# Patient Record
Sex: MALE | Race: WHITE | NOT HISPANIC OR LATINO
[De-identification: names, ages, dates, MRNs, and addresses within clinical notes are randomized per-mention and may not be internally consistent; named-entity substitution may affect disease eponyms.]

---

## 2017-12-29 ENCOUNTER — RESULT REVIEW (OUTPATIENT)
Age: 82
End: 2017-12-29

## 2017-12-29 ENCOUNTER — APPOINTMENT (OUTPATIENT)
Dept: DERMATOLOGY | Facility: CLINIC | Age: 82
End: 2017-12-29
Payer: MEDICARE

## 2017-12-29 PROCEDURE — 17003 DESTRUCT PREMALG LES 2-14: CPT

## 2017-12-29 PROCEDURE — 69100 BIOPSY OF EXTERNAL EAR: CPT | Mod: 59

## 2017-12-29 PROCEDURE — 11100 BX SKIN SUBCUTANEOUS&/MUCOUS MEMBRANE 1 LESION: CPT | Mod: 59

## 2017-12-29 PROCEDURE — 17000 DESTRUCT PREMALG LESION: CPT

## 2017-12-29 PROCEDURE — 99203 OFFICE O/P NEW LOW 30 MIN: CPT | Mod: 25

## 2017-12-29 PROCEDURE — 11101 BIOPSY SKIN SUBQ&/MUCOUS MEMBRANE EA ADDL LESN: CPT

## 2017-12-29 PROCEDURE — 10040 EXTRACTION: CPT | Mod: 59

## 2018-01-18 ENCOUNTER — RESULT REVIEW (OUTPATIENT)
Age: 83
End: 2018-01-18

## 2018-01-18 ENCOUNTER — APPOINTMENT (OUTPATIENT)
Dept: DERMATOLOGY | Facility: CLINIC | Age: 83
End: 2018-01-18
Payer: MEDICARE

## 2018-01-18 ENCOUNTER — APPOINTMENT (OUTPATIENT)
Dept: DERMATOLOGY | Facility: CLINIC | Age: 83
End: 2018-01-18

## 2018-01-18 PROCEDURE — 17000 DESTRUCT PREMALG LESION: CPT

## 2018-01-18 PROCEDURE — 17262 DSTRJ MAL LES T/A/L 1.1-2.0: CPT | Mod: 59

## 2018-01-18 PROCEDURE — 99212 OFFICE O/P EST SF 10 MIN: CPT | Mod: 25

## 2018-01-18 PROCEDURE — 17272 DSTR MAL LES S/N/H/F/G 1.1-2: CPT | Mod: 59

## 2018-01-18 PROCEDURE — 11100 BX SKIN SUBCUTANEOUS&/MUCOUS MEMBRANE 1 LESION: CPT | Mod: 59

## 2018-01-18 PROCEDURE — 10040 EXTRACTION: CPT | Mod: 59

## 2018-01-25 ENCOUNTER — APPOINTMENT (OUTPATIENT)
Dept: DERMATOLOGY | Facility: CLINIC | Age: 83
End: 2018-01-25
Payer: MEDICARE

## 2018-01-25 PROCEDURE — 99213 OFFICE O/P EST LOW 20 MIN: CPT

## 2018-03-05 ENCOUNTER — RESULT REVIEW (OUTPATIENT)
Age: 83
End: 2018-03-05

## 2018-03-06 ENCOUNTER — APPOINTMENT (OUTPATIENT)
Dept: DERMATOLOGY | Facility: CLINIC | Age: 83
End: 2018-03-06
Payer: MEDICARE

## 2018-03-06 PROCEDURE — 17262 DSTRJ MAL LES T/A/L 1.1-2.0: CPT

## 2018-03-06 PROCEDURE — 17261 DSTRJ MAL LES T/A/L .6-1.0CM: CPT | Mod: 59

## 2018-03-06 PROCEDURE — 99212 OFFICE O/P EST SF 10 MIN: CPT | Mod: 25

## 2018-03-30 ENCOUNTER — RECORD ABSTRACTING (OUTPATIENT)
Age: 83
End: 2018-03-30

## 2018-03-30 DIAGNOSIS — Z01.818 ENCOUNTER FOR OTHER PREPROCEDURAL EXAMINATION: ICD-10-CM

## 2018-03-30 DIAGNOSIS — Z87.81 PERSONAL HISTORY OF (HEALED) TRAUMATIC FRACTURE: ICD-10-CM

## 2018-03-30 DIAGNOSIS — Z87.448 PERSONAL HISTORY OF OTHER DISEASES OF URINARY SYSTEM: ICD-10-CM

## 2018-04-03 ENCOUNTER — APPOINTMENT (OUTPATIENT)
Dept: CARDIOLOGY | Facility: CLINIC | Age: 83
End: 2018-04-03
Payer: MEDICARE

## 2018-04-03 ENCOUNTER — TRANSCRIPTION ENCOUNTER (OUTPATIENT)
Age: 83
End: 2018-04-03

## 2018-04-03 VITALS
BODY MASS INDEX: 30.12 KG/M2 | HEART RATE: 55 BPM | WEIGHT: 170 LBS | HEIGHT: 63 IN | RESPIRATION RATE: 14 BRPM | SYSTOLIC BLOOD PRESSURE: 110 MMHG | DIASTOLIC BLOOD PRESSURE: 70 MMHG

## 2018-04-03 PROCEDURE — 93000 ELECTROCARDIOGRAM COMPLETE: CPT

## 2018-04-03 PROCEDURE — 99214 OFFICE O/P EST MOD 30 MIN: CPT

## 2018-04-03 RX ORDER — LOSARTAN POTASSIUM AND HYDROCHLOROTHIAZIDE 25; 100 MG/1; MG/1
100-25 TABLET ORAL DAILY
Qty: 90 | Refills: 3 | Status: DISCONTINUED | COMMUNITY
Start: 1900-01-01 | End: 2018-04-03

## 2018-04-03 RX ORDER — AMLODIPINE BESYLATE 5 MG/1
5 TABLET ORAL DAILY
Qty: 90 | Refills: 1 | Status: DISCONTINUED | COMMUNITY
End: 2018-04-03

## 2018-04-19 ENCOUNTER — APPOINTMENT (OUTPATIENT)
Dept: CARDIOLOGY | Facility: CLINIC | Age: 83
End: 2018-04-19
Payer: MEDICARE

## 2018-04-19 PROCEDURE — A9500: CPT

## 2018-04-19 PROCEDURE — 78452 HT MUSCLE IMAGE SPECT MULT: CPT

## 2018-04-19 PROCEDURE — 93015 CV STRESS TEST SUPVJ I&R: CPT

## 2018-04-26 RX ORDER — KIT FOR THE PREPARATION OF TECHNETIUM TC99M SESTAMIBI 1 MG/5ML
INJECTION, POWDER, LYOPHILIZED, FOR SOLUTION PARENTERAL
Refills: 0 | Status: COMPLETED | OUTPATIENT
Start: 2018-04-26

## 2018-04-26 RX ADMIN — KIT FOR THE PREPARATION OF TECHNETIUM TC99M SESTAMIBI 0: 1 INJECTION, POWDER, LYOPHILIZED, FOR SOLUTION PARENTERAL at 00:00

## 2018-06-12 ENCOUNTER — APPOINTMENT (OUTPATIENT)
Dept: CARDIOLOGY | Facility: CLINIC | Age: 83
End: 2018-06-12
Payer: MEDICARE

## 2018-06-12 VITALS
WEIGHT: 170 LBS | HEART RATE: 55 BPM | RESPIRATION RATE: 16 BRPM | BODY MASS INDEX: 30.12 KG/M2 | HEIGHT: 63 IN | DIASTOLIC BLOOD PRESSURE: 91 MMHG | SYSTOLIC BLOOD PRESSURE: 169 MMHG

## 2018-06-12 PROCEDURE — 93306 TTE W/DOPPLER COMPLETE: CPT

## 2018-06-12 PROCEDURE — 93000 ELECTROCARDIOGRAM COMPLETE: CPT

## 2018-06-12 PROCEDURE — 99214 OFFICE O/P EST MOD 30 MIN: CPT

## 2018-06-12 RX ORDER — METOPROLOL SUCCINATE 25 MG/1
25 TABLET, EXTENDED RELEASE ORAL DAILY
Qty: 90 | Refills: 3 | Status: ACTIVE | COMMUNITY
Start: 1900-01-01 | End: 1900-01-01

## 2018-07-31 ENCOUNTER — APPOINTMENT (OUTPATIENT)
Dept: CARDIOLOGY | Facility: CLINIC | Age: 83
End: 2018-07-31
Payer: MEDICARE

## 2018-07-31 VITALS
HEART RATE: 67 BPM | RESPIRATION RATE: 16 BRPM | SYSTOLIC BLOOD PRESSURE: 124 MMHG | HEIGHT: 63 IN | BODY MASS INDEX: 30.12 KG/M2 | WEIGHT: 170 LBS | DIASTOLIC BLOOD PRESSURE: 72 MMHG

## 2018-07-31 PROCEDURE — 93000 ELECTROCARDIOGRAM COMPLETE: CPT

## 2018-07-31 PROCEDURE — 99214 OFFICE O/P EST MOD 30 MIN: CPT

## 2018-07-31 RX ORDER — RANITIDINE 300 MG/1
300 TABLET ORAL DAILY
Refills: 0 | Status: DISCONTINUED | COMMUNITY
End: 2018-07-31

## 2018-07-31 RX ORDER — METOPROLOL TARTRATE 25 MG/1
25 TABLET, FILM COATED ORAL
Qty: 90 | Refills: 0 | Status: DISCONTINUED | COMMUNITY
Start: 2018-02-25

## 2018-07-31 RX ORDER — ERYTHROMYCIN AND BENZOYL PEROXIDE 3 %-5 %
5-3 KIT TOPICAL
Refills: 0 | Status: ACTIVE | COMMUNITY
Start: 2018-03-06

## 2018-07-31 RX ORDER — LOSARTAN POTASSIUM 100 MG/1
100 TABLET, FILM COATED ORAL
Qty: 90 | Refills: 0 | Status: DISCONTINUED | COMMUNITY
Start: 2018-03-19

## 2018-07-31 RX ORDER — AZITHROMYCIN 250 MG/1
250 TABLET, FILM COATED ORAL
Qty: 6 | Refills: 0 | Status: DISCONTINUED | COMMUNITY
Start: 2018-03-23

## 2018-09-13 ENCOUNTER — APPOINTMENT (OUTPATIENT)
Dept: CARDIOLOGY | Facility: CLINIC | Age: 83
End: 2018-09-13
Payer: MEDICARE

## 2018-09-13 VITALS
HEIGHT: 63 IN | RESPIRATION RATE: 16 BRPM | DIASTOLIC BLOOD PRESSURE: 80 MMHG | BODY MASS INDEX: 30.12 KG/M2 | WEIGHT: 170 LBS | HEART RATE: 64 BPM | SYSTOLIC BLOOD PRESSURE: 130 MMHG

## 2018-09-13 PROCEDURE — 99215 OFFICE O/P EST HI 40 MIN: CPT

## 2018-09-13 PROCEDURE — 93000 ELECTROCARDIOGRAM COMPLETE: CPT

## 2018-09-17 ENCOUNTER — MEDICATION RENEWAL (OUTPATIENT)
Age: 83
End: 2018-09-17

## 2018-09-18 ENCOUNTER — MEDICATION RENEWAL (OUTPATIENT)
Age: 83
End: 2018-09-18

## 2018-09-21 ENCOUNTER — OUTPATIENT (OUTPATIENT)
Dept: OUTPATIENT SERVICES | Facility: HOSPITAL | Age: 83
LOS: 1 days | End: 2018-09-21
Payer: MEDICARE

## 2018-09-21 VITALS
HEART RATE: 72 BPM | HEIGHT: 66 IN | RESPIRATION RATE: 11 BRPM | OXYGEN SATURATION: 96 % | TEMPERATURE: 97 F | SYSTOLIC BLOOD PRESSURE: 170 MMHG | WEIGHT: 169.98 LBS | DIASTOLIC BLOOD PRESSURE: 82 MMHG

## 2018-09-21 VITALS
RESPIRATION RATE: 18 BRPM | DIASTOLIC BLOOD PRESSURE: 82 MMHG | SYSTOLIC BLOOD PRESSURE: 170 MMHG | HEART RATE: 72 BPM | TEMPERATURE: 98 F | OXYGEN SATURATION: 96 %

## 2018-09-21 DIAGNOSIS — I20.9 ANGINA PECTORIS, UNSPECIFIED: ICD-10-CM

## 2018-09-21 DIAGNOSIS — Z95.5 PRESENCE OF CORONARY ANGIOPLASTY IMPLANT AND GRAFT: Chronic | ICD-10-CM

## 2018-09-21 DIAGNOSIS — Z01.810 ENCOUNTER FOR PREPROCEDURAL CARDIOVASCULAR EXAMINATION: ICD-10-CM

## 2018-09-21 DIAGNOSIS — Z90.49 ACQUIRED ABSENCE OF OTHER SPECIFIED PARTS OF DIGESTIVE TRACT: Chronic | ICD-10-CM

## 2018-09-21 DIAGNOSIS — K40.90 UNILATERAL INGUINAL HERNIA, WITHOUT OBSTRUCTION OR GANGRENE, NOT SPECIFIED AS RECURRENT: Chronic | ICD-10-CM

## 2018-09-21 DIAGNOSIS — I25.10 ATHEROSCLEROTIC HEART DISEASE OF NATIVE CORONARY ARTERY WITHOUT ANGINA PECTORIS: Chronic | ICD-10-CM

## 2018-09-21 DIAGNOSIS — C64.1 MALIGNANT NEOPLASM OF RIGHT KIDNEY, EXCEPT RENAL PELVIS: Chronic | ICD-10-CM

## 2018-09-21 DIAGNOSIS — M94.9 DISORDER OF CARTILAGE, UNSPECIFIED: Chronic | ICD-10-CM

## 2018-09-21 LAB
ANION GAP SERPL CALC-SCNC: 12 MMOL/L — SIGNIFICANT CHANGE UP (ref 5–17)
APTT BLD: 28.2 SEC — SIGNIFICANT CHANGE UP (ref 27.5–37.4)
BUN SERPL-MCNC: 28 MG/DL — HIGH (ref 8–20)
CALCIUM SERPL-MCNC: 8.8 MG/DL — SIGNIFICANT CHANGE UP (ref 8.6–10.2)
CHLORIDE SERPL-SCNC: 101 MMOL/L — SIGNIFICANT CHANGE UP (ref 98–107)
CO2 SERPL-SCNC: 24 MMOL/L — SIGNIFICANT CHANGE UP (ref 22–29)
CREAT SERPL-MCNC: 1.41 MG/DL — HIGH (ref 0.5–1.3)
GLUCOSE SERPL-MCNC: 201 MG/DL — HIGH (ref 70–115)
HCT VFR BLD CALC: 45.7 % — SIGNIFICANT CHANGE UP (ref 42–52)
HGB BLD-MCNC: 14.8 G/DL — SIGNIFICANT CHANGE UP (ref 14–18)
INR BLD: 1.06 RATIO — SIGNIFICANT CHANGE UP (ref 0.88–1.16)
MCHC RBC-ENTMCNC: 29 PG — SIGNIFICANT CHANGE UP (ref 27–31)
MCHC RBC-ENTMCNC: 32.4 G/DL — SIGNIFICANT CHANGE UP (ref 32–36)
MCV RBC AUTO: 89.4 FL — SIGNIFICANT CHANGE UP (ref 80–94)
PLATELET # BLD AUTO: 160 K/UL — SIGNIFICANT CHANGE UP (ref 150–400)
POTASSIUM SERPL-MCNC: 4.5 MMOL/L — SIGNIFICANT CHANGE UP (ref 3.5–5.3)
POTASSIUM SERPL-SCNC: 4.5 MMOL/L — SIGNIFICANT CHANGE UP (ref 3.5–5.3)
PROTHROM AB SERPL-ACNC: 11.7 SEC — SIGNIFICANT CHANGE UP (ref 9.8–12.7)
RBC # BLD: 5.11 M/UL — SIGNIFICANT CHANGE UP (ref 4.6–6.2)
RBC # FLD: 13.4 % — SIGNIFICANT CHANGE UP (ref 11–15.6)
SODIUM SERPL-SCNC: 137 MMOL/L — SIGNIFICANT CHANGE UP (ref 135–145)
WBC # BLD: 7.2 K/UL — SIGNIFICANT CHANGE UP (ref 4.8–10.8)
WBC # FLD AUTO: 7.2 K/UL — SIGNIFICANT CHANGE UP (ref 4.8–10.8)

## 2018-09-21 PROCEDURE — 85027 COMPLETE CBC AUTOMATED: CPT

## 2018-09-21 PROCEDURE — 36415 COLL VENOUS BLD VENIPUNCTURE: CPT

## 2018-09-21 PROCEDURE — 80048 BASIC METABOLIC PNL TOTAL CA: CPT

## 2018-09-21 PROCEDURE — 93005 ELECTROCARDIOGRAM TRACING: CPT

## 2018-09-21 PROCEDURE — G0463: CPT

## 2018-09-21 PROCEDURE — 93010 ELECTROCARDIOGRAM REPORT: CPT

## 2018-09-21 PROCEDURE — 85610 PROTHROMBIN TIME: CPT

## 2018-09-21 PROCEDURE — 85730 THROMBOPLASTIN TIME PARTIAL: CPT

## 2018-09-21 NOTE — H&P PST ADULT - PMH
CAD (coronary artery disease)    GERD (gastroesophageal reflux disease)    HTN (hypertension)    Renal mass, right

## 2018-09-21 NOTE — H&P PST ADULT - ASSESSMENT
89 yo male with history of CABG x 4 in 1972, Multiple Pci's LAD/RCA in 2000 and 2001. He also has a history of Right Renal Carcinoma. In light of his cardiac history, abnormal echo and symptoms he is scheduled for a cardiac cath.      Plan: PRE-PROCEDURE ASSESSMENT  -Patient seen and examined  -Labs reviewed  -Pre-procedure teaching completed with patient   -Questions answered about patients concerns    - pt instructed to hold diabetic meds day prior to procedure and 2 days after   -instructed to NPO after midnight.   - Pt instructed to have escort to and from procedure   -pt instructed IF STENT PLACED WILL REQUIRE TO STAY OVERNIGHT FOR MONITORING AND DISCHARGED IN THE AM .

## 2018-09-21 NOTE — H&P PST ADULT - HISTORY OF PRESENT ILLNESS
This is an amazingly active  89 yo male with history of CABG x 4 in 1972, Multiple Pci's LAD/RCA in 2000 and 2001. He also has a history of Right Renal Carcinoma.    He presents today with complaints of PETERSON only able to walk 60-80 feet.  Last stress test was 4 years ago (unsure of results).   Echo 6/2018 abnormal basal inferior segment, EF 50-55%.   In light of his cardiac history, abnormal echo and symptoms he is scheduled for a cardiac cath.

## 2018-09-21 NOTE — H&P PST ADULT - PSH
CAD (coronary artery disease)  s/p CABG 2 vessel 1973  Cartilage disorder  Left knee cartillage repair  Inguinal hernia  inguinal hernia repair - right  Renal cell cancer, right  s/p IR cryoablation  S/P cholecystectomy    Stented coronary artery  2001 and 2002

## 2018-09-26 ENCOUNTER — INPATIENT (INPATIENT)
Facility: HOSPITAL | Age: 83
LOS: 0 days | Discharge: HOME | DRG: 247 | End: 2018-09-27
Attending: INTERNAL MEDICINE | Admitting: INTERNAL MEDICINE
Payer: COMMERCIAL

## 2018-09-26 ENCOUNTER — TRANSCRIPTION ENCOUNTER (OUTPATIENT)
Age: 83
End: 2018-09-26

## 2018-09-26 VITALS
SYSTOLIC BLOOD PRESSURE: 175 MMHG | OXYGEN SATURATION: 95 % | TEMPERATURE: 99 F | RESPIRATION RATE: 18 BRPM | DIASTOLIC BLOOD PRESSURE: 97 MMHG | HEART RATE: 86 BPM

## 2018-09-26 DIAGNOSIS — K40.90 UNILATERAL INGUINAL HERNIA, WITHOUT OBSTRUCTION OR GANGRENE, NOT SPECIFIED AS RECURRENT: Chronic | ICD-10-CM

## 2018-09-26 DIAGNOSIS — Z90.49 ACQUIRED ABSENCE OF OTHER SPECIFIED PARTS OF DIGESTIVE TRACT: Chronic | ICD-10-CM

## 2018-09-26 DIAGNOSIS — M94.9 DISORDER OF CARTILAGE, UNSPECIFIED: Chronic | ICD-10-CM

## 2018-09-26 DIAGNOSIS — Z95.5 PRESENCE OF CORONARY ANGIOPLASTY IMPLANT AND GRAFT: Chronic | ICD-10-CM

## 2018-09-26 DIAGNOSIS — I20.9 ANGINA PECTORIS, UNSPECIFIED: ICD-10-CM

## 2018-09-26 DIAGNOSIS — C64.1 MALIGNANT NEOPLASM OF RIGHT KIDNEY, EXCEPT RENAL PELVIS: Chronic | ICD-10-CM

## 2018-09-26 DIAGNOSIS — I25.10 ATHEROSCLEROTIC HEART DISEASE OF NATIVE CORONARY ARTERY WITHOUT ANGINA PECTORIS: Chronic | ICD-10-CM

## 2018-09-26 LAB
BLD GP AB SCN SERPL QL: SIGNIFICANT CHANGE UP
TYPE + AB SCN PNL BLD: SIGNIFICANT CHANGE UP

## 2018-09-26 RX ORDER — SODIUM CHLORIDE 9 MG/ML
1000 INJECTION INTRAMUSCULAR; INTRAVENOUS; SUBCUTANEOUS
Qty: 0 | Refills: 0 | Status: DISCONTINUED | OUTPATIENT
Start: 2018-09-26 | End: 2018-09-27

## 2018-09-26 RX ORDER — AMLODIPINE BESYLATE 2.5 MG/1
2.5 TABLET ORAL DAILY
Qty: 0 | Refills: 0 | Status: DISCONTINUED | OUTPATIENT
Start: 2018-09-26 | End: 2018-09-27

## 2018-09-26 RX ORDER — ACETAMINOPHEN 500 MG
650 TABLET ORAL EVERY 6 HOURS
Qty: 0 | Refills: 0 | Status: DISCONTINUED | OUTPATIENT
Start: 2018-09-26 | End: 2018-09-27

## 2018-09-26 RX ORDER — RANOLAZINE 500 MG/1
1 TABLET, FILM COATED, EXTENDED RELEASE ORAL
Qty: 0 | Refills: 0 | COMMUNITY

## 2018-09-26 RX ORDER — TAMSULOSIN HYDROCHLORIDE 0.4 MG/1
1 CAPSULE ORAL
Qty: 0 | Refills: 0 | COMMUNITY

## 2018-09-26 RX ORDER — ATORVASTATIN CALCIUM 80 MG/1
10 TABLET, FILM COATED ORAL AT BEDTIME
Qty: 0 | Refills: 0 | Status: DISCONTINUED | OUTPATIENT
Start: 2018-09-26 | End: 2018-09-27

## 2018-09-26 RX ORDER — CLOPIDOGREL BISULFATE 75 MG/1
75 TABLET, FILM COATED ORAL DAILY
Qty: 0 | Refills: 0 | Status: DISCONTINUED | OUTPATIENT
Start: 2018-09-26 | End: 2018-09-27

## 2018-09-26 RX ORDER — HYDROCORTISONE 20 MG
100 TABLET ORAL ONCE
Qty: 0 | Refills: 0 | Status: COMPLETED | OUTPATIENT
Start: 2018-09-26 | End: 2018-09-26

## 2018-09-26 RX ORDER — LOSARTAN POTASSIUM 100 MG/1
100 TABLET, FILM COATED ORAL DAILY
Qty: 0 | Refills: 0 | Status: DISCONTINUED | OUTPATIENT
Start: 2018-09-26 | End: 2018-09-27

## 2018-09-26 RX ORDER — TAMSULOSIN HYDROCHLORIDE 0.4 MG/1
0.4 CAPSULE ORAL AT BEDTIME
Qty: 0 | Refills: 0 | Status: DISCONTINUED | OUTPATIENT
Start: 2018-09-26 | End: 2018-09-27

## 2018-09-26 RX ORDER — ASPIRIN/CALCIUM CARB/MAGNESIUM 324 MG
81 TABLET ORAL DAILY
Qty: 0 | Refills: 0 | Status: DISCONTINUED | OUTPATIENT
Start: 2018-09-26 | End: 2018-09-27

## 2018-09-26 RX ORDER — RANOLAZINE 500 MG/1
1000 TABLET, FILM COATED, EXTENDED RELEASE ORAL
Qty: 0 | Refills: 0 | Status: DISCONTINUED | OUTPATIENT
Start: 2018-09-26 | End: 2018-09-27

## 2018-09-26 RX ORDER — METOPROLOL TARTRATE 50 MG
25 TABLET ORAL DAILY
Qty: 0 | Refills: 0 | Status: DISCONTINUED | OUTPATIENT
Start: 2018-09-26 | End: 2018-09-27

## 2018-09-26 RX ADMIN — Medication 104 MILLIGRAM(S): at 09:15

## 2018-09-26 RX ADMIN — SODIUM CHLORIDE 150 MILLILITER(S): 9 INJECTION INTRAMUSCULAR; INTRAVENOUS; SUBCUTANEOUS at 11:36

## 2018-09-26 RX ADMIN — SODIUM CHLORIDE 150 MILLILITER(S): 9 INJECTION INTRAMUSCULAR; INTRAVENOUS; SUBCUTANEOUS at 17:14

## 2018-09-26 RX ADMIN — RANOLAZINE 1000 MILLIGRAM(S): 500 TABLET, FILM COATED, EXTENDED RELEASE ORAL at 18:16

## 2018-09-26 RX ADMIN — SODIUM CHLORIDE 220 MILLILITER(S): 9 INJECTION INTRAMUSCULAR; INTRAVENOUS; SUBCUTANEOUS at 08:43

## 2018-09-26 NOTE — DISCHARGE NOTE ADULT - CARE PROVIDER_API CALL
Graham Schuster), Cardiology; Cardiovascular Disease; Interventional Cardiology  [address] [id number]  Phone: [phone]  Fax: [phone] Sathish Hammond (MD), Cardiovascular Disease  [address] [place] [address]  Phone: [phone]  Fax: [phone]    sydney amaya  Phone: (   )    -  Fax: (   )    -

## 2018-09-26 NOTE — DISCHARGE NOTE ADULT - MEDICATION SUMMARY - MEDICATIONS TO TAKE
I will START or STAY ON the medications listed below when I get home from the hospital:    Lab work  -- Bun/Cr  GFR    Fax results to Dr. Sathish Hammond and Dr. Marco A Morton  -- Indication: For f/u kidneys    acetaminophen 325 mg oral tablet  -- 2 tab(s) by mouth every 6 hours, As needed, Mild Pain  -- Indication: For pain    acetaminophen 325 mg oral tablet  -- 2 tab(s) by mouth every 6 hours, As needed, For Temp over 38.3 C (100.94 F)  -- Indication: For pain    Aspirin Enteric Coated 81 mg oral delayed release tablet  -- 1 tab(s) by mouth once a day starting 3/13/14  -- Indication: For Antiplatelet    losartan 100 mg oral tablet  -- 1 tab(s) by mouth once a day  -- Indication: For heart    tamsulosin 0.4 mg oral capsule  -- 1 cap(s) by mouth once a day  -- Indication: For bph    Ranexa 1000 mg oral tablet, extended release  -- 1 tab(s) by mouth 2 times a day  -- Indication: For heart    Nitrostat 0.4 mg sublingual tablet  -- 1 tab(s) under tongue every 5 minutes, As Needed  -- Indication: For heart    glimepiride 1 mg oral tablet  -- 1 tab(s) by mouth once a day  -- Indication: For diabetes    atorvastatin 10 mg oral tablet  -- 1 tab(s) by mouth once a day (at bedtime)  -- Indication: For hyperlipidemia    Plavix 75 mg oral tablet  -- 1 tab(s) by mouth once a day starting 3/13/14  -- Indication: For Antiplatelet    metoprolol extended release 25 mg oral tablet, extended release  -- 1 tab(s) by mouth once a day  -- Indication: For heart    amlodipine 2.5 mg oral tablet  -- 1 tab(s) by mouth once a day  -- Indication: For heart    erythromycin-benzoyl peroxide 3%-5% topical gel  -- Apply on skin to affected area once a day (at bedtime)  -- Indication: For Skin    Zantac 300 300 mg oral tablet  -- 1 tab(s) by mouth once a day (at bedtime)  -- Indication: For gerd

## 2018-09-26 NOTE — PROGRESS NOTE ADULT - SUBJECTIVE AND OBJECTIVE BOX
Pt sp diagnostic cardiac catheterization on 9/26/2018. Pt denies pain or discomfort at this time.  Vss. # 6 arterial  removed.  Pt tolerated well.  Manual compression applied for 20 minutes.  Hemostasis achieved. No ooze or hematoma noted at site. Pedal pulses positive.

## 2018-09-26 NOTE — DISCHARGE NOTE ADULT - PLAN OF CARE
maintain optimal cardiac health No heavy lifting, driving, sex, tub baths, swimming, or any activity that submerges the lower half of the body in water for 48 hours.  Limited walking and stairs for 48 hours.    Change the bandaid after 24 hours and every 24 hours after that.  Keep the puncture site dry and covered with a bandaid until a scab forms.    Observe the site frequently.  If bleeding or a large lump (the size of a golf ball or bigger) occurs lie flat, apply continuous direct pressure just above the puncture site for at least 10 minutes, and notify your physician immediately.  If the bleeding cannot be controlled, call 911 immediately for assistance.  Notify your physician of pain, swelling or any drainage.    Notify your physician immediately if coldness, numbness, discoloration or pain in your foot occurs. Follow up with your Cardiologist as instructed.  Take all medications as instructed.  Speak to your doctor before stopping any medications.  Follow the specified diet.

## 2018-09-26 NOTE — DISCHARGE NOTE ADULT - CARE PLAN
Principal Discharge DX:	S/P coronary angioplasty  Goal:	maintain optimal cardiac health  Assessment and plan of treatment:	No heavy lifting, driving, sex, tub baths, swimming, or any activity that submerges the lower half of the body in water for 48 hours.  Limited walking and stairs for 48 hours.    Change the bandaid after 24 hours and every 24 hours after that.  Keep the puncture site dry and covered with a bandaid until a scab forms.    Observe the site frequently.  If bleeding or a large lump (the size of a golf ball or bigger) occurs lie flat, apply continuous direct pressure just above the puncture site for at least 10 minutes, and notify your physician immediately.  If the bleeding cannot be controlled, call 911 immediately for assistance.  Notify your physician of pain, swelling or any drainage.    Notify your physician immediately if coldness, numbness, discoloration or pain in your foot occurs.  Secondary Diagnosis:	Stented coronary artery  Goal:	maintain optimal cardiac health  Assessment and plan of treatment:	Follow up with your Cardiologist as instructed.  Take all medications as instructed.  Speak to your doctor before stopping any medications.  Follow the specified diet.

## 2018-09-26 NOTE — PROGRESS NOTE ADULT - SUBJECTIVE AND OBJECTIVE BOX
Nurse Practitioner Progress note:   s/p [facility] with successful PCI and THANIA to the 2nd diag  Patient feels well.  Denies chest pain, shortness of breath, dizziness or palpitations at this time. All other ROS as per HPI.    Patient A&O x3  Lungs CTA  S1S2 no MRG  Right groin procedure site CDI.  no bleeding, no hematoma, site soft, non tender, positive pedal pulses bilaterally        T(C): 37.4 (09-26-18 @ 07:46), Max: 37.4 (09-26-18 @ 07:46)  HR: 66 (09-26-18 @ 11:15) (64 - 86)  BP: 114/67 (09-26-18 @ 11:15) (114/67 - 175/97)  RR: 18 (09-26-18 @ 11:15) (16 - 18)  SpO2: 95% (09-26-18 @ 11:15) (93% - 95%)          MEDICATIONS  (STANDING):  sodium chloride 0.9%. 1000 milliLiter(s) (220 mL/Hr) IV Continuous <Continuous>      HPI:    now s/p [facility] with successful PCI and THANIA to the 2nd diag    ASSESSMENT/PLAN:    Coronary artery disease  -Admit to telemetry  	Age > 75; Co Morbid conditions GFR-43, Creat 1.41  -VS, labs, diet, activity as per PCI orders  -IV hydration  -Encourage PO fluids  -Aspirin 81 mg PO daily  -Plavix 75mg PO daily  -Toprol XL 25 mg PO daily  -Losartan 100 mg PO daily  -atorvastatin 10mg PO QHS   -Plan of care discussed with patient, family and MD  -likely d/c in AM if patient remains stable overnight  -NP to see in AM to evaluate labs, EKG and procedure site check  -Follow-up with attending Dr Schuster  within 1 week  -Discussed therapeutic lifestyle changes to reduce risk factors such as following a cardiac diet, weight loss, maintaining a healthy weight, exercise, smoking cessation, medication compliance, and regular follow-up  with MD to know your numbers (BP, cholesterol, weight, and glucose)

## 2018-09-26 NOTE — DISCHARGE NOTE ADULT - NS AS ACTIVITY OBS
Do not make important decisions/Do not drive or operate machinery/Showering allowed/Walking-Indoors allowed/No Heavy lifting/straining

## 2018-09-26 NOTE — DISCHARGE NOTE ADULT - PATIENT PORTAL LINK FT
You can access [facility], offered by [facility], by registering with the following website: [email or web address]

## 2018-09-27 VITALS — RESPIRATION RATE: 18 BRPM | DIASTOLIC BLOOD PRESSURE: 80 MMHG | SYSTOLIC BLOOD PRESSURE: 134 MMHG | HEART RATE: 72 BPM

## 2018-09-27 LAB
ANION GAP SERPL CALC-SCNC: 11 MMOL/L — SIGNIFICANT CHANGE UP (ref 5–17)
APTT BLD: 27.9 SEC — SIGNIFICANT CHANGE UP (ref 27.5–37.4)
BASOPHILS # BLD AUTO: 0 K/UL — SIGNIFICANT CHANGE UP (ref 0–0.2)
BASOPHILS NFR BLD AUTO: 0.1 % — SIGNIFICANT CHANGE UP (ref 0–2)
BUN SERPL-MCNC: 23 MG/DL — HIGH (ref 8–20)
CALCIUM SERPL-MCNC: 8.8 MG/DL — SIGNIFICANT CHANGE UP (ref 8.6–10.2)
CHLORIDE SERPL-SCNC: 104 MMOL/L — SIGNIFICANT CHANGE UP (ref 98–107)
CO2 SERPL-SCNC: 24 MMOL/L — SIGNIFICANT CHANGE UP (ref 22–29)
CREAT SERPL-MCNC: 1.37 MG/DL — HIGH (ref 0.5–1.3)
EOSINOPHIL # BLD AUTO: 0.2 K/UL — SIGNIFICANT CHANGE UP (ref 0–0.5)
EOSINOPHIL NFR BLD AUTO: 3.1 % — SIGNIFICANT CHANGE UP (ref 0–5)
GLUCOSE SERPL-MCNC: 124 MG/DL — HIGH (ref 70–115)
HCT VFR BLD CALC: 44.1 % — SIGNIFICANT CHANGE UP (ref 42–52)
HGB BLD-MCNC: 13.8 G/DL — LOW (ref 14–18)
INR BLD: 1.11 RATIO — SIGNIFICANT CHANGE UP (ref 0.88–1.16)
LYMPHOCYTES # BLD AUTO: 0.8 K/UL — LOW (ref 1–4.8)
LYMPHOCYTES # BLD AUTO: 12.1 % — LOW (ref 20–55)
MCHC RBC-ENTMCNC: 28 PG — SIGNIFICANT CHANGE UP (ref 27–31)
MCHC RBC-ENTMCNC: 31.3 G/DL — LOW (ref 32–36)
MCV RBC AUTO: 89.6 FL — SIGNIFICANT CHANGE UP (ref 80–94)
MONOCYTES # BLD AUTO: 0.6 K/UL — SIGNIFICANT CHANGE UP (ref 0–0.8)
MONOCYTES NFR BLD AUTO: 8.4 % — SIGNIFICANT CHANGE UP (ref 3–10)
NEUTROPHILS # BLD AUTO: 5.3 K/UL — SIGNIFICANT CHANGE UP (ref 1.8–8)
NEUTROPHILS NFR BLD AUTO: 76 % — HIGH (ref 37–73)
PLATELET # BLD AUTO: 140 K/UL — LOW (ref 150–400)
POTASSIUM SERPL-MCNC: 4 MMOL/L — SIGNIFICANT CHANGE UP (ref 3.5–5.3)
POTASSIUM SERPL-SCNC: 4 MMOL/L — SIGNIFICANT CHANGE UP (ref 3.5–5.3)
PROTHROM AB SERPL-ACNC: 12.2 SEC — SIGNIFICANT CHANGE UP (ref 9.8–12.7)
RBC # BLD: 4.92 M/UL — SIGNIFICANT CHANGE UP (ref 4.6–6.2)
RBC # FLD: 13.7 % — SIGNIFICANT CHANGE UP (ref 11–15.6)
SODIUM SERPL-SCNC: 139 MMOL/L — SIGNIFICANT CHANGE UP (ref 135–145)
WBC # BLD: 7 K/UL — SIGNIFICANT CHANGE UP (ref 4.8–10.8)
WBC # FLD AUTO: 7 K/UL — SIGNIFICANT CHANGE UP (ref 4.8–10.8)

## 2018-09-27 PROCEDURE — 93010 ELECTROCARDIOGRAM REPORT: CPT

## 2018-09-27 RX ORDER — ATORVASTATIN CALCIUM 80 MG/1
1 TABLET, FILM COATED ORAL
Qty: 30 | Refills: 5 | OUTPATIENT
Start: 2018-09-27 | End: 2019-03-25

## 2018-09-27 RX ADMIN — CLOPIDOGREL BISULFATE 75 MILLIGRAM(S): 75 TABLET, FILM COATED ORAL at 08:46

## 2018-09-27 RX ADMIN — AMLODIPINE BESYLATE 2.5 MILLIGRAM(S): 2.5 TABLET ORAL at 06:04

## 2018-09-27 RX ADMIN — Medication 25 MILLIGRAM(S): at 06:04

## 2018-09-27 RX ADMIN — ATORVASTATIN CALCIUM 10 MILLIGRAM(S): 80 TABLET, FILM COATED ORAL at 06:04

## 2018-09-27 RX ADMIN — TAMSULOSIN HYDROCHLORIDE 0.4 MILLIGRAM(S): 0.4 CAPSULE ORAL at 06:04

## 2018-09-27 RX ADMIN — Medication 81 MILLIGRAM(S): at 08:46

## 2018-09-27 RX ADMIN — LOSARTAN POTASSIUM 100 MILLIGRAM(S): 100 TABLET, FILM COATED ORAL at 06:04

## 2018-09-27 RX ADMIN — RANOLAZINE 1000 MILLIGRAM(S): 500 TABLET, FILM COATED, EXTENDED RELEASE ORAL at 06:03

## 2018-09-27 NOTE — PROGRESS NOTE ADULT - SUBJECTIVE AND OBJECTIVE BOX
Nurse Practitioner Progress note:     INTERVAL HISTORY: 89 year old male with h/o CABG, multi PCIs with abnormal stress and symptomatic    MEDICATIONS:  amLODIPine   Tablet 2.5 milliGRAM(s) Oral daily  losartan 100 milliGRAM(s) Oral daily  metoprolol succinate ER 25 milliGRAM(s) Oral daily  ranolazine 1000 milliGRAM(s) Oral two times a day  tamsulosin 0.4 milliGRAM(s) Oral at bedtime        acetaminophen   Tablet .. 650 milliGRAM(s) Oral every 6 hours PRN      atorvastatin 10 milliGRAM(s) Oral at bedtime    aspirin enteric coated 81 milliGRAM(s) Oral daily  clopidogrel Tablet 75 milliGRAM(s) Oral daily  sodium chloride 0.9%. 1000 milliLiter(s) IV Continuous <Continuous>  sodium chloride 0.9%. 1000 milliLiter(s) IV Continuous <Continuous>      TELEMETRY: 1st degree HB     T(C): --  HR: 71 (09-27-18 @ 06:12) (54 - 71)  BP: 148/80 (09-27-18 @ 06:12) (105/65 - 171/84)  RR: 16 (09-26-18 @ 20:30) (16 - 18)  SpO2: 95% (09-27-18 @ 06:12) (93% - 96%)  Wt(kg): --    PHYSICAL EXAM:  Appearance: Normal	  Cardiovascular: Normal S1 S2, No JVD, No murmurs, No edema  Respiratory: Lungs clear to auscultation	  Psychiatry: A & O x 3, Mood & affect appropriate  Gastrointestinal:  Soft, Non-tender, + BS	  Neurologic: Non-focal, A&O X3.  No neuro deficits  Procedure Site: Right groin dressing removed.  Site benign.  No bleeding/hematoma/ecchymosis.  + palp pedal pulse    12 lead EKG:  	NSR 1st degree HB 69 bpm.  No acute changes    LABS:	 	                            13.8   7.0   )-----------( 140      ( 27 Sep 2018 06:21 )             44.1     09-27    139  |  104  |  23.0<H>  ----------------------------<  124<H>  4.0   |  24.0  |  1.37<H>    Ca    8.8      27 Sep 2018 06:21          PROCEDURE RESULTS: S/P PCI with THANIA X1 to 2nd diag via right groin. POD #2.  No complications    ASSESSMENT/PLAN: 	  -Groin precautions reviewed  -Resume home meds  -Follow up with Dr. Hammond  -Rx for bun/cr given to pt to follow up with Dr Morton  -Discharge to home

## 2018-10-04 ENCOUNTER — APPOINTMENT (OUTPATIENT)
Dept: CARDIOLOGY | Facility: CLINIC | Age: 83
End: 2018-10-04
Payer: MEDICARE

## 2018-10-04 VITALS
HEART RATE: 72 BPM | HEIGHT: 64 IN | DIASTOLIC BLOOD PRESSURE: 70 MMHG | WEIGHT: 171 LBS | BODY MASS INDEX: 29.19 KG/M2 | RESPIRATION RATE: 14 BRPM | SYSTOLIC BLOOD PRESSURE: 123 MMHG

## 2018-10-04 PROCEDURE — 99214 OFFICE O/P EST MOD 30 MIN: CPT

## 2018-10-04 PROCEDURE — 93000 ELECTROCARDIOGRAM COMPLETE: CPT

## 2018-10-04 RX ORDER — ACETYLCYSTEINE 200 MG/ML
20 SOLUTION ORAL; RESPIRATORY (INHALATION)
Qty: 40 | Refills: 0 | Status: DISCONTINUED | COMMUNITY
Start: 2018-09-13 | End: 2018-10-04

## 2018-10-24 PROCEDURE — 36415 COLL VENOUS BLD VENIPUNCTURE: CPT

## 2018-10-24 PROCEDURE — 76937 US GUIDE VASCULAR ACCESS: CPT

## 2018-10-24 PROCEDURE — C1725: CPT

## 2018-10-24 PROCEDURE — C1894: CPT

## 2018-10-24 PROCEDURE — 86901 BLOOD TYPING SEROLOGIC RH(D): CPT

## 2018-10-24 PROCEDURE — C9600: CPT | Mod: LD

## 2018-10-24 PROCEDURE — 85610 PROTHROMBIN TIME: CPT

## 2018-10-24 PROCEDURE — 99153 MOD SED SAME PHYS/QHP EA: CPT

## 2018-10-24 PROCEDURE — C1874: CPT

## 2018-10-24 PROCEDURE — 80048 BASIC METABOLIC PNL TOTAL CA: CPT

## 2018-10-24 PROCEDURE — C1887: CPT

## 2018-10-24 PROCEDURE — 85027 COMPLETE CBC AUTOMATED: CPT

## 2018-10-24 PROCEDURE — 85730 THROMBOPLASTIN TIME PARTIAL: CPT

## 2018-10-24 PROCEDURE — 86900 BLOOD TYPING SEROLOGIC ABO: CPT

## 2018-10-24 PROCEDURE — 86850 RBC ANTIBODY SCREEN: CPT

## 2018-10-24 PROCEDURE — 93005 ELECTROCARDIOGRAM TRACING: CPT

## 2018-10-24 PROCEDURE — 99152 MOD SED SAME PHYS/QHP 5/>YRS: CPT

## 2018-10-24 PROCEDURE — 93458 L HRT ARTERY/VENTRICLE ANGIO: CPT

## 2018-10-24 PROCEDURE — C1769: CPT

## 2018-12-01 RX ORDER — ATORVASTATIN CALCIUM 10 MG/1
10 TABLET, FILM COATED ORAL
Qty: 90 | Refills: 3 | Status: ACTIVE | COMMUNITY
Start: 2018-09-27

## 2018-12-06 ENCOUNTER — APPOINTMENT (OUTPATIENT)
Dept: CARDIOLOGY | Facility: CLINIC | Age: 83
End: 2018-12-06
Payer: MEDICARE

## 2018-12-06 VITALS
HEART RATE: 82 BPM | DIASTOLIC BLOOD PRESSURE: 80 MMHG | BODY MASS INDEX: 28.34 KG/M2 | RESPIRATION RATE: 14 BRPM | HEIGHT: 64 IN | SYSTOLIC BLOOD PRESSURE: 130 MMHG | WEIGHT: 166 LBS

## 2018-12-06 PROCEDURE — 93000 ELECTROCARDIOGRAM COMPLETE: CPT

## 2018-12-06 PROCEDURE — 99214 OFFICE O/P EST MOD 30 MIN: CPT

## 2018-12-06 NOTE — REASON FOR VISIT
[FreeTextEntry1] : Patient presents for cardiac reevaluation status post hospitalization 11/24 and 11/25 2018.\par \par The history seems a bit and exact. 5 the patient and family his recollection he was sent to the hospital with shortness of breath, or, notes indicate that the patient was complaining of chest and back pain.\par \par He underwent an evaluation for the above and he ruled out for myocardial infarction by serial troponins.\par Other significant laboratory data included:\par Creatinine of 1.4 which is down from size 1.5 and 1.7 a couple months ago.\par A CT of the chest that showed 4.1 cm ascending aortic aneurysm. Thickening of the esophagus.\par An ultrasound demonstrated a previously known right renal mass of greater than 5 cm.\par \par Patient currently presenting with a cough of several days' duration seemingly related to his other complaints.\par \par Patient also reports that he underwent an epidural injection November 21. \par \par \par Cardiac hx is that of :\par \par 1. History of coronary artery disease.\par \par 2. Remote CABG in 1972\par \par 3. Remote PCI is 2001 in 2002\par \par 4. Chronic kidney disease with a history of a renal cell carcinoma\par \par 5. Recent stenting of diagonal 2  9/26/18:

## 2018-12-06 NOTE — ASSESSMENT
[FreeTextEntry1] : ECG: Normal sinus rate at 82. No significant ST-T wave changes.\par \par Cardiac catheterization 9/26/18:\par Left main no significant disease\par LAD 30% in-stent stenosis\par Diagonal one small disease\par Diagonal 2 tubular 85% stenosis STENTED\par Circumflex 40% stenosis\par marginal mild disease\par Right coronary artery large and dominant with ectasia 30% distal stenosis.\par \par Impression:\par 1. Recent hospitalization does not seem to have been cardiac in nature.\par 2. Currently has what seems to be a bout of bronchitis.\par 3. No active anginal symptoms status post back onto stenting September this year.\par 3 renal insufficiency which seems to have recovered status post intervention in September.\par 4 renal cell tumor which the patient prefers not to have addressed medically.\par 5 chronic dyspnea of uncertain etiology. Cardiac anatomy and left ventricular function and did not seem to explain it.\par \par Plan:\par No indication to change current medical management from a cardiac perspective.\par 2 followup with Dr. Duarte with regard to the cough\par 3 instructed the patient and family not to undergo any further procedures that require stoppage of antiplatelet medications.\par 4 contact with markedly worsening shortness of breath or recurrent chest pain

## 2018-12-06 NOTE — PHYSICAL EXAM
[Normal Conjunctiva] : the conjunctiva exhibited no abnormalities [Eyelids - No Xanthelasma] : the eyelids demonstrated no xanthelasmas [Normal Oral Mucosa] : normal oral mucosa [No Oral Pallor] : no oral pallor [No Oral Cyanosis] : no oral cyanosis [Normal Jugular Venous A Waves Present] : normal jugular venous A waves present [Normal Jugular Venous V Waves Present] : normal jugular venous V waves present [No Jugular Venous Andujar A Waves] : no jugular venous andujar A waves [Respiration, Rhythm And Depth] : normal respiratory rhythm and effort [Exaggerated Use Of Accessory Muscles For Inspiration] : no accessory muscle use [Auscultation Breath Sounds / Voice Sounds] : lungs were clear to auscultation bilaterally [Abdomen Soft] : soft [Abdomen Tenderness] : non-tender [Abdomen Mass (___ Cm)] : no abdominal mass palpated [Abnormal Walk] : normal gait [Gait - Sufficient For Exercise Testing] : the gait was sufficient for exercise testing [Nail Clubbing] : no clubbing of the fingernails [Cyanosis, Localized] : no localized cyanosis [Petechial Hemorrhages (___cm)] : no petechial hemorrhages [FreeTextEntry1] : The right groin is soft and intact with a good pulse and distally as well perfused. [Skin Color & Pigmentation] : normal skin color and pigmentation [] : no rash [No Venous Stasis] : no venous stasis [Skin Lesions] : no skin lesions [No Skin Ulcers] : no skin ulcer [No Xanthoma] : no  xanthoma was observed [Oriented To Time, Place, And Person] : oriented to person, place, and time [Affect] : the affect was normal [Mood] : the mood was normal [No Anxiety] : not feeling anxious

## 2019-03-07 ENCOUNTER — APPOINTMENT (OUTPATIENT)
Dept: CARDIOLOGY | Facility: CLINIC | Age: 84
End: 2019-03-07
Payer: MEDICARE

## 2019-03-07 ENCOUNTER — RECORD ABSTRACTING (OUTPATIENT)
Age: 84
End: 2019-03-07

## 2019-03-07 ENCOUNTER — NON-APPOINTMENT (OUTPATIENT)
Age: 84
End: 2019-03-07

## 2019-03-07 VITALS
WEIGHT: 170 LBS | RESPIRATION RATE: 16 BRPM | BODY MASS INDEX: 29.02 KG/M2 | SYSTOLIC BLOOD PRESSURE: 140 MMHG | HEIGHT: 64 IN | HEART RATE: 58 BPM | DIASTOLIC BLOOD PRESSURE: 80 MMHG

## 2019-03-07 DIAGNOSIS — C44.91 BASAL CELL CARCINOMA OF SKIN, UNSPECIFIED: ICD-10-CM

## 2019-03-07 PROCEDURE — 99214 OFFICE O/P EST MOD 30 MIN: CPT

## 2019-03-07 PROCEDURE — 93000 ELECTROCARDIOGRAM COMPLETE: CPT

## 2019-03-07 RX ORDER — LIDOCAINE 36 MG/1
1.8 PATCH TOPICAL
Qty: 30 | Refills: 0 | Status: ACTIVE | COMMUNITY
Start: 2018-11-01

## 2019-03-07 RX ORDER — CLOPIDOGREL BISULFATE 75 MG/1
75 TABLET, FILM COATED ORAL DAILY
Qty: 90 | Refills: 3 | Status: ACTIVE | COMMUNITY

## 2019-03-07 NOTE — PHYSICAL EXAM
[Normal Conjunctiva] : the conjunctiva exhibited no abnormalities [Eyelids - No Xanthelasma] : the eyelids demonstrated no xanthelasmas [Normal Oral Mucosa] : normal oral mucosa [No Oral Pallor] : no oral pallor [No Oral Cyanosis] : no oral cyanosis [Normal Jugular Venous A Waves Present] : normal jugular venous A waves present [Normal Jugular Venous V Waves Present] : normal jugular venous V waves present [No Jugular Venous Andujar A Waves] : no jugular venous andujar A waves [Respiration, Rhythm And Depth] : normal respiratory rhythm and effort [Exaggerated Use Of Accessory Muscles For Inspiration] : no accessory muscle use [Auscultation Breath Sounds / Voice Sounds] : lungs were clear to auscultation bilaterally [Abdomen Soft] : soft [Abdomen Tenderness] : non-tender [Abdomen Mass (___ Cm)] : no abdominal mass palpated [Abnormal Walk] : normal gait [Gait - Sufficient For Exercise Testing] : the gait was sufficient for exercise testing [Nail Clubbing] : no clubbing of the fingernails [Cyanosis, Localized] : no localized cyanosis [Petechial Hemorrhages (___cm)] : no petechial hemorrhages [FreeTextEntry1] : The right groin is soft and intact with a good pulse and distally as well perfused.1+ bilateral ankle and pretibial edema [Skin Color & Pigmentation] : normal skin color and pigmentation [] : no rash [No Venous Stasis] : no venous stasis [Skin Lesions] : no skin lesions [No Skin Ulcers] : no skin ulcer [No Xanthoma] : no  xanthoma was observed [Oriented To Time, Place, And Person] : oriented to person, place, and time [Affect] : the affect was normal [Mood] : the mood was normal [No Anxiety] : not feeling anxious

## 2019-03-07 NOTE — ASSESSMENT
[FreeTextEntry1] : ECG: Normal sinus rate at 58. No significant ST-T wave changes.\par \par Cardiac catheterization 9/26/18:\par Left main no significant disease\par LAD 30% in-stent stenosis\par Diagonal one small disease\par Diagonal 2 tubular 85% stenosis STENTED\par Circumflex 40% stenosis\par marginal mild disease\par Right coronary artery large and dominant with ectasia 30% distal stenosis.\par \par \par Creatinine of 1.4 which is down from 1.5 and 1.7 a couple months ago.\par A CT of the chest that showed 4.1 cm ascending aortic aneurysm. Thickening of the esophagus.\par An ultrasound demonstrated a previously known right renal mass of greater than 5 cm.\par \par Impression:\par 1. Baseline PETERSON which appears unchanged from baseline\par 2. Rt hip pain limits ambulation.\par 3. No active anginal symptoms status post back onto stenting September this year.\par 3 renal insufficiency which seems to have recovered status post intervention in September.\par 4 renal cell tumor which the patient prefers not to have addressed medically.\par 5 chronic dyspnea of uncertain etiology. Cardiac anatomy and left ventricular function and did not seem to explain it.\par \par Plan:\par No indication to change current medical management from a cardiac perspective.\par 2 followup with Dr. Strauss\par 3 instructed the patient and family not to undergo any further procedures that require stoppage of antiplatelet medications.\par 4 contact with markedly worsening shortness of breath or recurrent chest pain

## 2019-03-07 NOTE — REASON FOR VISIT
[FreeTextEntry1] : Patient presents for cardiac reevaluation\par Major issues continue to be that of ongoing chronic exertional dyspnea. This does not appear to have substantially change. There is no PND, orthopnea. There is mild bilateral ankle and pretibial edema.\par \par Patient also has right hip pain and this seems to primarily limit his ambulatory ability.\par \par He has made no changes about his wish to avoid any aggressive or interventional approach to his health at this time.\par .\par Cardiac hx is that of :\par \par 1. History of coronary artery disease.\par \par 2. Remote CABG in 1972\par \par 3. Remote PCI is 2001 in 2002\par \par 4. Chronic kidney disease with a history of a renal cell carcinoma\par \par 5. Recent stenting of diagonal 2  9/26/18:

## 2019-05-10 ENCOUNTER — TRANSCRIPTION ENCOUNTER (OUTPATIENT)
Age: 84
End: 2019-05-10

## 2019-05-10 ENCOUNTER — MEDICATION RENEWAL (OUTPATIENT)
Age: 84
End: 2019-05-10

## 2019-05-10 ENCOUNTER — RX CHANGE (OUTPATIENT)
Age: 84
End: 2019-05-10

## 2019-06-27 ENCOUNTER — APPOINTMENT (OUTPATIENT)
Dept: CARDIOLOGY | Facility: CLINIC | Age: 84
End: 2019-06-27
Payer: MEDICARE

## 2019-06-27 ENCOUNTER — NON-APPOINTMENT (OUTPATIENT)
Age: 84
End: 2019-06-27

## 2019-06-27 VITALS
RESPIRATION RATE: 16 BRPM | BODY MASS INDEX: 29.37 KG/M2 | HEIGHT: 64 IN | HEART RATE: 72 BPM | OXYGEN SATURATION: 95 % | DIASTOLIC BLOOD PRESSURE: 70 MMHG | SYSTOLIC BLOOD PRESSURE: 120 MMHG | WEIGHT: 172 LBS

## 2019-06-27 PROCEDURE — 99215 OFFICE O/P EST HI 40 MIN: CPT

## 2019-06-27 PROCEDURE — 93000 ELECTROCARDIOGRAM COMPLETE: CPT

## 2019-06-27 RX ORDER — MINOCYCLINE HYDROCHLORIDE 100 MG/1
100 CAPSULE ORAL
Qty: 14 | Refills: 0 | Status: DISCONTINUED | COMMUNITY
Start: 2018-12-05 | End: 2019-06-27

## 2019-06-27 RX ORDER — TAMSULOSIN HYDROCHLORIDE 0.4 MG/1
0.4 CAPSULE ORAL DAILY
Qty: 90 | Refills: 3 | Status: DISCONTINUED | COMMUNITY
End: 2019-06-27

## 2019-06-27 NOTE — ASSESSMENT
[FreeTextEntry1] : ECG: Normal sinus rate at 72 BPM, 1st degree AVB with diffuse T wave flattening.\par \par Laboratory data 6/17/2019\par Chol 115\par Tri. 270\par HDL 29\par LDL 32\par \par Creat. 1.6 ( baseline)\par K 4.3\par HGB 15.2\par \par Cardiac catheterization 9/26/18:\par Left main no significant disease\par LAD 30% in-stent stenosis\par Diagonal one small disease\par Diagonal 2 tubular 85% stenosis STENTED\par Circumflex 40% stenosis\par marginal mild disease\par Right coronary artery large and dominant with ectasia 30% distal stenosis.\par \par A CT of the chest that showed 4.1 cm ascending aortic aneurysm. Thickening of the esophagus.\par An ultrasound demonstrated a previously known right renal mass of greater than 5 cm.\par \par Impression:\par \par During exam his he received x1 0.4 sublingual Nitroglycerin for his substernal chest pain which soon resolved, his blood pressure post administration is 124/78. His only complaint at this time now is a slight headache which is slowly resolving.\par \par 1. Baseline PETERSON which appears unchanged from baseline\par 2. Rt hip pain limits ambulation.\par 3. New onset of chest pain which he received a therapeutic challenge of sublingual Nitroglycerin, soon after his pain had resolved. Seems possible that he's having breakthrough angina on ranexa. \par 3 History of renal insufficiency with a stable creatinine 1.6, 5/3/2019 1.7, Would avoid repeat cath is possible given degree of CKD.\par 4 Renal cell tumor which the patient prefers not to have addressed medically.\par 5 chronic dyspnea of uncertain etiology. Cardiac anatomy and left ventricular function and did not seem to explain it.\par 6. Lipids controlled\par 7. Blood pressure controlled, 120/70 in office today.\par \par \par Plan:\par 1.   Continue to F/U with PCP as scheduled and have all blood work faxed to our office.\par 2.   To avoid hypotension we will discontinue his Amlodipine and start him on Isosorbide Mononitrate ER 30 mg QD         in addition to the ranexa.\par       Rx SL NTG PRN\par 3. Contact our office immediately with worsening shortness of breath or recurrent chest pain.\par 4. Repeat echocardiogram to reassess history if inferior basal wall infarction and mitral valve regurgitation.\par \par Clinical follow up in 2 months

## 2019-06-27 NOTE — HISTORY OF PRESENT ILLNESS
[FreeTextEntry1] : \par During his exam he states he is experiencing the pain which started at 9:13 am and points to the mid sternal region of his chest. He denies any radiation now and states it  can last a couple of minutes. The pain is reproducible on palpation. He has no other associated symptoms during this episode of discomfort \par \par On 6/26/2019 he stated this  sharp pain started out in his upper  back area  which then radiated to the front with f his chest with some associated  sweating. His daughter attributes this to not having the air conditioning on.\par \par His major issues continue to be that of ongoing chronic exertional dyspnea. This does not appear to have substantially change and remains at his baseline . There is no PND or orthopnea. \par \par Chronic  right hip pain and this seems to primarily limit his ambulatory ability.\par

## 2019-06-27 NOTE — PHYSICAL EXAM
[Normal Conjunctiva] : the conjunctiva exhibited no abnormalities [Eyelids - No Xanthelasma] : the eyelids demonstrated no xanthelasmas [Normal Oral Mucosa] : normal oral mucosa [No Oral Cyanosis] : no oral cyanosis [No Oral Pallor] : no oral pallor [Normal Jugular Venous A Waves Present] : normal jugular venous A waves present [Normal Jugular Venous V Waves Present] : normal jugular venous V waves present [No Jugular Venous Andujar A Waves] : no jugular venous andujar A waves [Respiration, Rhythm And Depth] : normal respiratory rhythm and effort [Exaggerated Use Of Accessory Muscles For Inspiration] : no accessory muscle use [Auscultation Breath Sounds / Voice Sounds] : lungs were clear to auscultation bilaterally [Abdomen Soft] : soft [Abdomen Tenderness] : non-tender [Abdomen Mass (___ Cm)] : no abdominal mass palpated [Gait - Sufficient For Exercise Testing] : the gait was sufficient for exercise testing [Abnormal Walk] : normal gait [Nail Clubbing] : no clubbing of the fingernails [Cyanosis, Localized] : no localized cyanosis [Petechial Hemorrhages (___cm)] : no petechial hemorrhages [] : no rash [Skin Color & Pigmentation] : normal skin color and pigmentation [No Venous Stasis] : no venous stasis [Skin Lesions] : no skin lesions [No Skin Ulcers] : no skin ulcer [No Xanthoma] : no  xanthoma was observed [Oriented To Time, Place, And Person] : oriented to person, place, and time [Affect] : the affect was normal [Mood] : the mood was normal [No Anxiety] : not feeling anxious [FreeTextEntry1] : Cardiac:\par Nl S1 S2 with a grade 1/6  BEKA and no significant  gallops or rubs.

## 2019-06-27 NOTE — REASON FOR VISIT
[FreeTextEntry1] : This is a 89 year old patient presenting for cardiac reevaluation with concerns of a new chest pain which first presented it self on 6/26/2019.\par .\par Cardiac hx is that of :\par \par 1. History of coronary artery disease.\par \par 2. Remote CABG in 1972\par \par 3. Remote PCI is 2001 in 2002\par \par 4. Chronic kidney disease with a history of a renal cell carcinoma\par \par 5. Recent stenting of diagonal 2  9/26/18:

## 2019-07-26 ENCOUNTER — APPOINTMENT (OUTPATIENT)
Dept: CARDIOLOGY | Facility: CLINIC | Age: 84
End: 2019-07-26
Payer: MEDICARE

## 2019-07-26 PROCEDURE — 93306 TTE W/DOPPLER COMPLETE: CPT

## 2019-08-05 ENCOUNTER — MEDICATION RENEWAL (OUTPATIENT)
Age: 84
End: 2019-08-05

## 2019-08-05 ENCOUNTER — RX RENEWAL (OUTPATIENT)
Age: 84
End: 2019-08-05

## 2019-08-05 NOTE — ASU PATIENT PROFILE, ADULT - NS PRO TALK SOMEONE YN
"Anesthesia Transfer of Care Note    Patient: Abdoul Ochoa    Procedure(s) Performed: Procedure(s) (LRB):  INSERTION, PULSE GENERATOR, ICD, SINGLE CHAMBER (Left)    Patient location: PACU    Anesthesia Type: general    Transport from OR: Transported from OR on 6-10 L/min O2 by face mask with adequate spontaneous ventilation    Post pain: adequate analgesia    Post assessment: no apparent anesthetic complications    Post vital signs: stable    Level of consciousness: awake    Nausea/Vomiting: no nausea/vomiting    Complications: none    Transfer of care protocol was followed      Last vitals:   Visit Vitals  /84 (BP Location: Left arm, Patient Position: Lying)   Pulse (!) 54   Temp 36.9 °C (98.4 °F) (Oral)   Resp 18   Ht 5' 6" (1.676 m)   Wt 80.6 kg (177 lb 11.1 oz)   SpO2 95%   BMI 28.68 kg/m²     "
no

## 2019-09-11 ENCOUNTER — NON-APPOINTMENT (OUTPATIENT)
Age: 84
End: 2019-09-11

## 2019-09-11 ENCOUNTER — APPOINTMENT (OUTPATIENT)
Dept: CARDIOLOGY | Facility: CLINIC | Age: 84
End: 2019-09-11
Payer: MEDICARE

## 2019-09-11 VITALS
SYSTOLIC BLOOD PRESSURE: 115 MMHG | DIASTOLIC BLOOD PRESSURE: 70 MMHG | WEIGHT: 176 LBS | HEART RATE: 64 BPM | RESPIRATION RATE: 16 BRPM | BODY MASS INDEX: 30.05 KG/M2 | OXYGEN SATURATION: 95 % | HEIGHT: 64 IN

## 2019-09-11 PROCEDURE — 99214 OFFICE O/P EST MOD 30 MIN: CPT

## 2019-09-11 PROCEDURE — 93000 ELECTROCARDIOGRAM COMPLETE: CPT

## 2019-09-11 RX ORDER — RANOLAZINE 1000 MG/1
1000 TABLET, FILM COATED, EXTENDED RELEASE ORAL
Qty: 3 | Refills: 3 | Status: DISCONTINUED | COMMUNITY
Start: 2018-06-12 | End: 2019-09-11

## 2019-09-11 NOTE — PHYSICAL EXAM
[Normal Conjunctiva] : the conjunctiva exhibited no abnormalities [Eyelids - No Xanthelasma] : the eyelids demonstrated no xanthelasmas [Normal Oral Mucosa] : normal oral mucosa [No Oral Cyanosis] : no oral cyanosis [No Oral Pallor] : no oral pallor [Normal Jugular Venous A Waves Present] : normal jugular venous A waves present [No Jugular Venous Andujar A Waves] : no jugular venous andujar A waves [Normal Jugular Venous V Waves Present] : normal jugular venous V waves present [Respiration, Rhythm And Depth] : normal respiratory rhythm and effort [Exaggerated Use Of Accessory Muscles For Inspiration] : no accessory muscle use [Auscultation Breath Sounds / Voice Sounds] : lungs were clear to auscultation bilaterally [Abdomen Soft] : soft [Abdomen Tenderness] : non-tender [Abdomen Mass (___ Cm)] : no abdominal mass palpated [Gait - Sufficient For Exercise Testing] : the gait was sufficient for exercise testing [Abnormal Walk] : normal gait [Nail Clubbing] : no clubbing of the fingernails [Petechial Hemorrhages (___cm)] : no petechial hemorrhages [Cyanosis, Localized] : no localized cyanosis [] : no rash [Skin Color & Pigmentation] : normal skin color and pigmentation [No Venous Stasis] : no venous stasis [Skin Lesions] : no skin lesions [No Skin Ulcers] : no skin ulcer [No Xanthoma] : no  xanthoma was observed [Affect] : the affect was normal [Oriented To Time, Place, And Person] : oriented to person, place, and time [No Anxiety] : not feeling anxious [Mood] : the mood was normal [FreeTextEntry1] : .1+ bilateral ankle and pretibial edema

## 2019-09-11 NOTE — REASON FOR VISIT
[FreeTextEntry1] : This is a 89 year old patient presenting for cardiac reevaluation.\par .\par Cardiac hx is that of :\par \par 1. History of coronary artery disease.\par \par 2. Remote CABG in 1972\par \par 3. Remote PCI is 2001 in 2002\par \par 4. Chronic kidney disease with a history of a renal cell carcinoma\par \par 5. Recent stenting of diagonal 2  9/26/18:\par \par Here to review results of an echocardiogram  completed on 7/26/19

## 2019-09-11 NOTE — ASSESSMENT
[FreeTextEntry1] : ECG: Normal sinus rate at 84 BPM, 1st degree AVB with diffuse nonspecific  T wave flattening.\par \par Laboratory data 6/17/2019\par Chol 115\par Tri. 270\par HDL 29\par LDL 32\par \par Creat. 1.6 ( baseline)\par K 4.3\par HGB 15.2\par \par \par Echocardiogram 7/26/19\par EF 55-60%\par Mod. aortic dilation measuring at 3.8 cm\par Inferior hypokinesis\par Cardiac catheterization 9/26/18:\par Left main no significant disease\par LAD 30% in-stent stenosis\par Diagonal one small disease\par Diagonal 2 tubular 85% stenosis STENTED\par Circumflex 40% stenosis\par marginal mild disease\par Right coronary artery large and dominant with ectasia 30% distal stenosis.\par \par A CT of the chest that showed 4.1 cm ascending aortic aneurysm. Thickening of the esophagus.\par An ultrasound demonstrated a previously known right renal mass of greater than 5 cm.\par \par Impression:\par \par 1. PETERSON which appears unchanged from baseline\par 2. Rt hip pain limits ambulation.\par 3. New onset of chest pain which he received a therapeutic challenge of sublingual Nitroglycerin, soon after his         pain had resolved. Now on Imdur in addition to Ranexa and has since resolved.\par 3 History of renal insufficiency with a stable creatinine 1.6, 5/3/2019 1.7, Would avoid repeat cath is possible     given degree of CKD.\par 4 Renal cell tumor which the patient prefers not to have addressed medically.\par 5 chronic dyspnea of uncertain etiology. Cardiac anatomy and left ventricular function and did not seem to explain   it.\par 6. Lipids controlled\par 7. Blood pressure controlled, 115/70 and controlled\par 8. Mild 1+ b/l ankle edema with 4 lb weight gain\par 9. Echocardiogram revealing stable mild dilation of ascending aorta and hypokinesis of the inferior basal wall         which is old. \par \par \par Plan:\par 1.   Continue to F/U with PCP as scheduled and have all blood work faxed to our office.\par 2.   For improvement of edema his Losartan HCTZ will be increased to 100-25 mg QD\par 3. Contact our office immediately with worsening shortness of breath or recurrent chest pain.\par \par \par At this time no further cardiac testing is needed.\par Clinical follow up in 4 months

## 2019-09-11 NOTE — HISTORY OF PRESENT ILLNESS
[FreeTextEntry1] : During our previous visit we discussed with concerns of a new chest pain which first presented it self on 6/26/2019.During his exam he states he is experiencing the pain and points to the mid sternal region of his chest. Imdur was added in addition to his Ranexa and is now free of anginal discomfort. \par \par Does express concerns of SOB on exertion which is chronic .This does not appear to have substantially change and remains at his baseline\par \par Remains physically active\par \par There is no edema PND or orthopnea. \par \par Chronic  right hip pain and this seems to primarily limit his ambulatory ability.\par

## 2019-10-11 ENCOUNTER — RX RENEWAL (OUTPATIENT)
Age: 84
End: 2019-10-11

## 2019-10-11 RX ORDER — LOSARTAN POTASSIUM AND HYDROCHLOROTHIAZIDE 25; 100 MG/1; MG/1
100-25 TABLET ORAL DAILY
Qty: 90 | Refills: 3 | Status: DISCONTINUED | COMMUNITY
Start: 2019-05-10 | End: 2019-10-11

## 2020-01-07 NOTE — PHYSICAL EXAM
[Normal Conjunctiva] : the conjunctiva exhibited no abnormalities [Eyelids - No Xanthelasma] : the eyelids demonstrated no xanthelasmas [Normal Oral Mucosa] : normal oral mucosa [No Oral Pallor] : no oral pallor [No Oral Cyanosis] : no oral cyanosis [Normal Jugular Venous A Waves Present] : normal jugular venous A waves present [Normal Jugular Venous V Waves Present] : normal jugular venous V waves present [No Jugular Venous Andujar A Waves] : no jugular venous andujar A waves [Respiration, Rhythm And Depth] : normal respiratory rhythm and effort [Exaggerated Use Of Accessory Muscles For Inspiration] : no accessory muscle use [Auscultation Breath Sounds / Voice Sounds] : lungs were clear to auscultation bilaterally [Abdomen Tenderness] : non-tender [Abdomen Soft] : soft [Abdomen Mass (___ Cm)] : no abdominal mass palpated [Nail Clubbing] : no clubbing of the fingernails [Gait - Sufficient For Exercise Testing] : the gait was sufficient for exercise testing [Abnormal Walk] : normal gait [Petechial Hemorrhages (___cm)] : no petechial hemorrhages [Cyanosis, Localized] : no localized cyanosis [FreeTextEntry1] : .1+ bilateral ankle and pretibial edema [Skin Color & Pigmentation] : normal skin color and pigmentation [] : no rash [No Venous Stasis] : no venous stasis [Skin Lesions] : no skin lesions [No Skin Ulcers] : no skin ulcer [Oriented To Time, Place, And Person] : oriented to person, place, and time [No Xanthoma] : no  xanthoma was observed [Mood] : the mood was normal [Affect] : the affect was normal [No Anxiety] : not feeling anxious

## 2020-01-14 ENCOUNTER — APPOINTMENT (OUTPATIENT)
Dept: CARDIOLOGY | Facility: CLINIC | Age: 85
End: 2020-01-14
Payer: MEDICARE

## 2020-01-14 ENCOUNTER — NON-APPOINTMENT (OUTPATIENT)
Age: 85
End: 2020-01-14

## 2020-01-14 VITALS
WEIGHT: 174 LBS | HEIGHT: 64 IN | OXYGEN SATURATION: 94 % | SYSTOLIC BLOOD PRESSURE: 140 MMHG | HEART RATE: 55 BPM | BODY MASS INDEX: 29.71 KG/M2 | RESPIRATION RATE: 16 BRPM | DIASTOLIC BLOOD PRESSURE: 80 MMHG

## 2020-01-14 DIAGNOSIS — Z00.00 ENCOUNTER FOR GENERAL ADULT MEDICAL EXAMINATION W/OUT ABNORMAL FINDINGS: ICD-10-CM

## 2020-01-14 PROCEDURE — 93000 ELECTROCARDIOGRAM COMPLETE: CPT

## 2020-01-14 PROCEDURE — 99214 OFFICE O/P EST MOD 30 MIN: CPT

## 2020-01-14 RX ORDER — GLIMEPIRIDE 2 MG/1
2 TABLET ORAL
Refills: 0 | Status: ACTIVE | COMMUNITY

## 2020-01-14 RX ORDER — RANITIDINE HYDROCHLORIDE 300 MG/1
300 TABLET, FILM COATED ORAL DAILY
Refills: 0 | Status: DISCONTINUED | COMMUNITY
End: 2020-01-14

## 2020-01-14 NOTE — HISTORY OF PRESENT ILLNESS
[FreeTextEntry1] : He'd previously had concerns re a new chest pain which first presented it self on 6/26/2019.During his exam he states he is experiencing the pain and points to the mid sternal region of his chest. Imdur was added in addition to his Ranexa and is now free of anginal discomfort. \par \par Does express concerns of SOB on exertion which is chronic .This does not appear to have substantially change and remains at his baseline\par \par Remains physically active\par \par There is no edema PND or orthopnea. \par \par Chronic  right hip pain and this seems to primarily limit his ambulatory ability.\par

## 2020-01-14 NOTE — PHYSICAL EXAM
[Normal Conjunctiva] : the conjunctiva exhibited no abnormalities [Eyelids - No Xanthelasma] : the eyelids demonstrated no xanthelasmas [Normal Oral Mucosa] : normal oral mucosa [No Oral Pallor] : no oral pallor [No Oral Cyanosis] : no oral cyanosis [Normal Jugular Venous V Waves Present] : normal jugular venous V waves present [Normal Jugular Venous A Waves Present] : normal jugular venous A waves present [No Jugular Venous Andujar A Waves] : no jugular venous andujar A waves [Respiration, Rhythm And Depth] : normal respiratory rhythm and effort [Exaggerated Use Of Accessory Muscles For Inspiration] : no accessory muscle use [Auscultation Breath Sounds / Voice Sounds] : lungs were clear to auscultation bilaterally [Abdomen Soft] : soft [Abdomen Tenderness] : non-tender [Abdomen Mass (___ Cm)] : no abdominal mass palpated [Abnormal Walk] : normal gait [Gait - Sufficient For Exercise Testing] : the gait was sufficient for exercise testing [Nail Clubbing] : no clubbing of the fingernails [Cyanosis, Localized] : no localized cyanosis [Petechial Hemorrhages (___cm)] : no petechial hemorrhages [FreeTextEntry1] : .1+ bilateral ankle and pretibial edema [Skin Color & Pigmentation] : normal skin color and pigmentation [] : no rash [No Venous Stasis] : no venous stasis [Skin Lesions] : no skin lesions [No Skin Ulcers] : no skin ulcer [No Xanthoma] : no  xanthoma was observed [Oriented To Time, Place, And Person] : oriented to person, place, and time [Affect] : the affect was normal [Mood] : the mood was normal [No Anxiety] : not feeling anxious

## 2020-01-14 NOTE — REASON FOR VISIT
[FreeTextEntry1] : This is a 89 year old patient presenting for cardiac reevaluation.\par .\par Cardiac hx is that of :\par \par 1. History of coronary artery disease.\par \par 2. Remote CABG in 1972\par \par 3. Remote PCI is 2001 in 2002\par \par 4. Chronic kidney disease with a history of a renal cell carcinoma\par \par 5. Recent stenting of diagonal 2  9/26/18:\par \par

## 2020-01-14 NOTE — ASSESSMENT
[FreeTextEntry1] : ECG: Normal sinus rate at 55 BPM, 1st degree AVB with diffuse nonspecific  T wave flattening. PRWP c/w AWMI\par \par Laboratory data 6/17/2019\par Chol 115\par Tri. 270\par HDL 29\par LDL 32\par \par Creat. 1.54 ( baseline)\par K 4.3\par HGB 15.2\par \par \par Echocardiogram 7/26/19\par EF 55-60%\par Mod. aortic dilation measuring at 3.8 cm\par Inferior hypokinesis\par \par Cardiac catheterization 9/26/18:\par Left main no significant disease\par LAD 30% in-stent stenosis\par Diagonal one small disease\par Diagonal 2 tubular 85% stenosis STENTED\par Circumflex 40% stenosis\par marginal mild disease\par Right coronary artery large and dominant with ectasia 30% distal stenosis.\par \par A CT of the chest that showed 4.1 cm ascending aortic aneurysm. Thickening of the esophagus.\par An ultrasound demonstrated a previously known right renal mass of greater than 5 cm.\par \par Impression:\par \par 1. PETERSON which appears unchanged from baseline\par \par 2. Rt hip pain limits ambulation.\par \par 3. New onset of chest pain which he received a therapeutic challenge of sublingual Nitroglycerin, soon after his         pain had resolved. Now on Imdur in addition to Ranexa and has since resolved.\par \par 3 History of renal insufficiency with a stable creatinine 1.6, 5/3/2019 1.7, Would avoid repeat cath is possible     given degree of CKD.\par \par 4 Renal cell tumor which the patient prefers not to have addressed medically.\par \par 5 chronic dyspnea of uncertain etiology. Cardiac anatomy and left ventricular function and did not seem to explain   it.\par 6. Lipids controlled\par \par 7. Blood pressure controlled, 115/70 and controlled\par \par 8. Mild ascending aorta aneurysm\par     Echocardiogram revealing stable mild dilation of ascending aorta and hypokinesis of the inferior basal wall             which is old. \par 9. less edema with increased HCTZ\par \par Plan:\par 1.   Continue to F/U with PCP as scheduled and have all blood work faxed to our office.\par 2.   Contact our office immediately with worsening shortness of breath or recurrent chest pain.\par \par \par At this time no further cardiac testing is needed. F/u 4 months\par Clinical follow up in 4 months

## 2020-05-01 RX ORDER — METOCLOPRAMIDE 5 MG/1
5 TABLET ORAL
Refills: 0 | Status: DISCONTINUED | COMMUNITY
Start: 2018-11-26 | End: 2020-05-01

## 2020-05-05 ENCOUNTER — APPOINTMENT (OUTPATIENT)
Dept: CARDIOLOGY | Facility: CLINIC | Age: 85
End: 2020-05-05
Payer: MEDICARE

## 2020-05-05 ENCOUNTER — APPOINTMENT (OUTPATIENT)
Dept: CARDIOLOGY | Facility: CLINIC | Age: 85
End: 2020-05-05

## 2020-05-05 VITALS — WEIGHT: 174 LBS | BODY MASS INDEX: 29.87 KG/M2

## 2020-05-05 DIAGNOSIS — R42 DIZZINESS AND GIDDINESS: ICD-10-CM

## 2020-05-05 PROCEDURE — 99214 OFFICE O/P EST MOD 30 MIN: CPT | Mod: 95

## 2020-05-05 NOTE — ASSESSMENT
[FreeTextEntry1] :  \par Laboratory data 9/16/19:\par Creatinine 1.5\par Hemoglobin 14.1\par \par 6/17/2019\par Chol 115\par Tri. 270\par HDL 29\par LDL 32\par \par Creat. 1.54 ( baseline)\par K 4.3\par HGB 15.2\par \par \par Echocardiogram 7/26/19\par EF 55-60%\par Mod. aortic dilation measuring at 3.8 cm\par Inferior hypokinesis\par \par Cardiac catheterization 9/26/18:\par Left main no significant disease\par LAD 30% in-stent stenosis\par Diagonal one small disease\par Diagonal 2 tubular 85% stenosis STENTED\par Circumflex 40% stenosis\par marginal mild disease\par Right coronary artery large and dominant with ectasia 30% distal stenosis.\par \par A CT of the chest that showed 4.1 cm ascending aortic aneurysm. Thickening of the esophagus.\par An ultrasound demonstrated a previously known right renal mass of greater than 5 cm.\par \par Impression:\par \par 1. PETERSON which appears unchanged from baseline\par \par 2. Rt hip pain limits ambulation.\par \par 3. Chest pain which has resolved on Ranexa and Imdur\par \par 3 History of renal insufficiency with a stable creatinine 1.6, 5/3/2019 1.7, Would avoid repeat cath is possible     given degree of CKD.\par \par 4 Renal cell tumor which the patient prefers not to have addressed medically.\par \par 5 chronic dyspnea of uncertain etiology. Cardiac anatomy and left ventricular function and did not seem to explain   it.\par 6. Lipids controlled\par \par 7. Blood pressure controlled\par \par 8. Mild ascending aorta aneurysm 4.1 cm \par     Echocardiogram revealing stable mild dilation of ascending aorta and hypokinesis of the inferior basal wall             which is old. \par 9. less edema with increased HCTZ\par \par Plan:\par 1.   Continue to F/U with PCP as scheduled and have all blood work faxed to our office.\par \par 2.   Contact our office immediately with worsening shortness of breath or recurrent chest pain.\par \par \par At this time no further cardiac testing is needed. F/u 4 months\par

## 2020-05-05 NOTE — PHYSICAL EXAM
[Normal Appearance] : normal appearance [General Appearance - Well Developed] : well developed [Normal Conjunctiva] : the conjunctiva exhibited no abnormalities [General Appearance - Well Nourished] : well nourished [Normal Jugular Venous V Waves Present] : normal jugular venous V waves present [Respiration, Rhythm And Depth] : normal respiratory rhythm and effort [Exaggerated Use Of Accessory Muscles For Inspiration] : no accessory muscle use [FreeTextEntry1] : No edema [Skin Color & Pigmentation] : normal skin color and pigmentation [Skin Turgor] : normal skin turgor [] : no rash [Oriented To Time, Place, And Person] : oriented to person, place, and time [Impaired Insight] : insight and judgment were intact [No Anxiety] : not feeling anxious

## 2020-05-05 NOTE — HISTORY OF PRESENT ILLNESS
[Home] : at home, [unfilled] , at the time of the visit. [Medical Office: ([facility])___] : at the medical office located in  [Patient] : the patient [Self] : self [FreeTextEntry1] : He'd had concerns re a new chest pain 6/26/2019.\par The pain was mid sternal region of his chest. Imdur was added in addition to his Ranexa and he remains free of anginal discomfort. \par \par Does express concerns of SOB on exertion which is chronic .This does not appear to have substantially change and remains at his baseline\par \par Remains physically active around the house and has been socially isolated except dtr\par \par There is no edema PND or orthopnea. \par \par Chronic  right hip pain and this seems to primarily limit his ambulatory ability.\par

## 2020-06-08 ENCOUNTER — RX RENEWAL (OUTPATIENT)
Age: 85
End: 2020-06-08

## 2020-08-24 ENCOUNTER — APPOINTMENT (OUTPATIENT)
Dept: CARDIOLOGY | Facility: CLINIC | Age: 85
End: 2020-08-24
Payer: MEDICARE

## 2020-08-24 ENCOUNTER — NON-APPOINTMENT (OUTPATIENT)
Age: 85
End: 2020-08-24

## 2020-08-24 VITALS
DIASTOLIC BLOOD PRESSURE: 70 MMHG | OXYGEN SATURATION: 96 % | TEMPERATURE: 97.7 F | BODY MASS INDEX: 28.34 KG/M2 | WEIGHT: 166 LBS | HEART RATE: 66 BPM | RESPIRATION RATE: 16 BRPM | HEIGHT: 64 IN | SYSTOLIC BLOOD PRESSURE: 125 MMHG

## 2020-08-24 PROCEDURE — 93000 ELECTROCARDIOGRAM COMPLETE: CPT

## 2020-08-24 PROCEDURE — 99214 OFFICE O/P EST MOD 30 MIN: CPT

## 2020-08-24 NOTE — HISTORY OF PRESENT ILLNESS
[Home] : at home, [unfilled] , at the time of the visit. [Medical Office: ([facility])___] : at the medical office located in  [Patient] : the patient [Self] : self [FreeTextEntry1] : Patient hospitalized 8/4/20-8/7/20 with fever, shortness of breath and diagnosis of UTI was made.\par \par With treatment of the infection and shortness of breath improved.\par His creatinine during that hospitalization ranged 1.4-2.0.\par A CT of the chest showed a moderate size hiatal hernia.\par A stable right lower pole renal mass lesion.\par Ectasia of the aorta.\par \par He'd had concerns re a new chest pain 6/26/2019.\par The pain was mid sternal region of his chest. Imdur was added in addition to his Ranexa and he remains free of anginal discomfort. \par \par Does express concerns of SOB on exertion which is chronic .This does not appear to have substantially change and remains at his baseline\par \par Remains physically active around the house and has been socially isolated except dtr\par \par There is no edema PND or orthopnea. \par \par Chronic  right hip pain and this seems to primarily limit his ambulatory ability.\par

## 2020-08-24 NOTE — REASON FOR VISIT
[FreeTextEntry1] : This is a 89  year old patient presenting for cardiac reevaluation.\par .\par Cardiac hx is that of :\par \par 1. History of coronary artery disease.\par \par 2. Remote CABG in 1972\par \par 3. Remote PCI is 2001 in 2002\par \par 4. Chronic kidney disease with a history of a renal cell carcinoma\par \par 5.Stenting of diagonal 2  9/26/18:\par \par

## 2020-08-24 NOTE — PHYSICAL EXAM
[Normal Appearance] : normal appearance [General Appearance - Well Developed] : well developed [General Appearance - Well Nourished] : well nourished [Normal Conjunctiva] : the conjunctiva exhibited no abnormalities [Normal Jugular Venous V Waves Present] : normal jugular venous V waves present [Respiration, Rhythm And Depth] : normal respiratory rhythm and effort [Exaggerated Use Of Accessory Muscles For Inspiration] : no accessory muscle use [Skin Color & Pigmentation] : normal skin color and pigmentation [] : no rash [Skin Turgor] : normal skin turgor [Impaired Insight] : insight and judgment were intact [Oriented To Time, Place, And Person] : oriented to person, place, and time [No Anxiety] : not feeling anxious [FreeTextEntry1] : slow deliberate gait

## 2020-09-01 ENCOUNTER — APPOINTMENT (OUTPATIENT)
Dept: CARDIOLOGY | Facility: CLINIC | Age: 85
End: 2020-09-01

## 2020-09-14 ENCOUNTER — RX RENEWAL (OUTPATIENT)
Age: 85
End: 2020-09-14

## 2020-09-14 RX ORDER — HYDROCHLOROTHIAZIDE 25 MG/1
25 TABLET ORAL DAILY
Qty: 90 | Refills: 3 | Status: ACTIVE | COMMUNITY
Start: 2019-10-11 | End: 1900-01-01

## 2020-12-09 ENCOUNTER — RX RENEWAL (OUTPATIENT)
Age: 85
End: 2020-12-09

## 2020-12-15 ENCOUNTER — APPOINTMENT (OUTPATIENT)
Dept: CARDIOLOGY | Facility: CLINIC | Age: 85
End: 2020-12-15
Payer: MEDICARE

## 2020-12-15 ENCOUNTER — NON-APPOINTMENT (OUTPATIENT)
Age: 85
End: 2020-12-15

## 2020-12-15 VITALS
HEART RATE: 61 BPM | TEMPERATURE: 96.7 F | BODY MASS INDEX: 29.88 KG/M2 | DIASTOLIC BLOOD PRESSURE: 76 MMHG | RESPIRATION RATE: 16 BRPM | WEIGHT: 175 LBS | SYSTOLIC BLOOD PRESSURE: 130 MMHG | HEIGHT: 64 IN

## 2020-12-15 DIAGNOSIS — N40.0 BENIGN PROSTATIC HYPERPLASIA WITHOUT LOWER URINARY TRACT SYMPMS: ICD-10-CM

## 2020-12-15 PROCEDURE — 99214 OFFICE O/P EST MOD 30 MIN: CPT

## 2020-12-15 PROCEDURE — 93000 ELECTROCARDIOGRAM COMPLETE: CPT

## 2020-12-15 RX ORDER — LOSARTAN POTASSIUM 50 MG/1
50 TABLET, FILM COATED ORAL DAILY
Qty: 90 | Refills: 3 | Status: ACTIVE | COMMUNITY
Start: 2019-10-11 | End: 1900-01-01

## 2020-12-15 RX ORDER — ISOSORBIDE MONONITRATE 60 MG/1
60 TABLET, EXTENDED RELEASE ORAL DAILY
Qty: 90 | Refills: 3 | Status: ACTIVE | COMMUNITY
Start: 2019-06-27 | End: 1900-01-01

## 2020-12-15 NOTE — REVIEW OF SYSTEMS
[Recent Weight Loss (___ Lbs)] : no recent weight loss [FreeTextEntry1] : Other than as documented here and in the HPI, the thirteen point ROS is negative

## 2020-12-15 NOTE — PHYSICAL EXAM
[General Appearance - Well Developed] : well developed [Normal Appearance] : normal appearance [General Appearance - Well Nourished] : well nourished [Normal Conjunctiva] : the conjunctiva exhibited no abnormalities [Normal Jugular Venous V Waves Present] : normal jugular venous V waves present [Respiration, Rhythm And Depth] : normal respiratory rhythm and effort [Exaggerated Use Of Accessory Muscles For Inspiration] : no accessory muscle use [Skin Color & Pigmentation] : normal skin color and pigmentation [Skin Turgor] : normal skin turgor [Oriented To Time, Place, And Person] : oriented to person, place, and time [Impaired Insight] : insight and judgment were intact [No Anxiety] : not feeling anxious [Heart Sounds] : normal S1 and S2 [Abdomen Soft] : soft [Abdomen Tenderness] : non-tender [] : no hepato-splenomegaly [Abdomen Mass (___ Cm)] : no abdominal mass palpated [FreeTextEntry1] : slow deliberate gait [Nail Clubbing] : no clubbing of the fingernails [Cyanosis, Localized] : no localized cyanosis [Petechial Hemorrhages (___cm)] : no petechial hemorrhages

## 2020-12-15 NOTE — ASSESSMENT
[FreeTextEntry1] :  ECG: Normal sinus rhythm at 61 first-degree AV block with minor nonspecific T wave abnormalities\par \par Laboratory data \par 8/17/2020 BUN/creatinine 37/1.86\par \par 9/24/2020 BUN/creatinine 29/1.92\par \par 9/16/19:\par Creatinine 1.5\par \par 8/6/20 creatinine 1.6\par \par Hemoglobin 14.1\par \par 6/17/2019\par Chol 115\par Tri. 270\par HDL 29\par LDL 32\par \par Creat. 1.54 ( baseline)\par K 4.3\par HGB 15.2\par \par \par Echocardiogram 7/26/19\par EF 55-60%\par Mod. aortic dilation measuring at 3.8 cm\par Inferior hypokinesis\par \par Cardiac catheterization 9/26/18:\par Left main no significant disease\par LAD 30% in-stent stenosis\par Diagonal one small disease\par Diagonal 2 tubular 85% stenosis STENTED\par Circumflex 40% stenosis\par marginal mild disease\par Right coronary artery large and dominant with ectasia 30% distal stenosis.\par \par A CT of the chest that showed 4.1 cm ascending aortic aneurysm. Thickening of the esophagus.\par An ultrasound demonstrated a previously known right renal mass of greater than 5 cm.\par \par Impression:\par \par 1. PETERSON which appears unchanged from baseline\par \par 2. Rt hip pain limits ambulation.\par \par 3. Chest pain which has resolved on Ranexa and Imdur\par \par 3 History of renal insufficiency with a stable creatinine \par    Increase in creatinine noted on laboratory data from August and September.\par \par 4 Right Renal cell tumor which the patient prefers not to have addressed medically.\par \par 5 chronic dyspnea of uncertain etiology. Cardiac anatomy and left ventricular function and did not seem to explain   it.\par 6. Lipids controlled\par \par 7. Blood pressure controlled\par \par 8. Mild ascending aorta aneurysm 4.1 cm \par     Echocardiogram revealing stable mild dilation of ascending aorta and hypokinesis of the inferior basal wall             which is old. \par 9. less edema with increased HCTZ\par \par Plan:\par 1.   Continue to F/U with PCP as scheduled and have all blood work faxed to our office.\par \par 2.   Contact our office immediately with worsening shortness of breath or recurrent chest pain.\par \par 3.  We will decrease losartan from 100 to 50 mg p.o. daily\par \par 4.  We will increase isosorbide from 30 mg to 60 mg a day.\par \par 5.  We will obtain repeat blood work in 2 months.\par \par \par At this time no further cardiac testing is needed. F/u 4 months\par

## 2020-12-15 NOTE — HISTORY OF PRESENT ILLNESS
[Home] : at home, [unfilled] , at the time of the visit. [Medical Office: ([facility])___] : at the medical office located in  [Patient] : the patient [Self] : self [FreeTextEntry1] : Patient hospitalized 8/4/20-8/7/20 with fever, shortness of breath and diagnosis of UTI was made.\par \par With treatment of the infection and shortness of breath improved.\par His creatinine during that hospitalization ranged 1.4-2.0.\par A CT of the chest showed a moderate size hiatal hernia.\par A stable right lower pole renal mass lesion.  Sees Dr Layton nicholas\par Ectasia of the aorta.\par \par He'd had concerns re a new chest pain 6/26/2019.\par The pain was mid sternal region of his chest. Imdur was added in addition to his Ranexa and he remains free of anginal discomfort. \par \par Does express concerns re: SOB on exertion which is chronic .This does not appear to have substantially change and remains at his baseline\par \par Remains physically active around the house and has been socially isolated except dtr\par \par There is no edema PND or orthopnea. \par \par Chronic  right hip pain and this seems to primarily limit his ambulatory ability.\par

## 2021-01-27 DIAGNOSIS — U07.1 COVID-19: ICD-10-CM

## 2021-03-01 ENCOUNTER — RX RENEWAL (OUTPATIENT)
Age: 86
End: 2021-03-01

## 2021-04-13 ENCOUNTER — NON-APPOINTMENT (OUTPATIENT)
Age: 86
End: 2021-04-13

## 2021-05-04 ENCOUNTER — APPOINTMENT (OUTPATIENT)
Dept: CARDIOLOGY | Facility: CLINIC | Age: 86
End: 2021-05-04
Payer: MEDICARE

## 2021-05-04 VITALS
RESPIRATION RATE: 16 BRPM | HEIGHT: 64 IN | HEART RATE: 60 BPM | WEIGHT: 174 LBS | SYSTOLIC BLOOD PRESSURE: 152 MMHG | TEMPERATURE: 97.8 F | DIASTOLIC BLOOD PRESSURE: 85 MMHG | OXYGEN SATURATION: 97 % | BODY MASS INDEX: 29.71 KG/M2

## 2021-05-04 PROCEDURE — 93000 ELECTROCARDIOGRAM COMPLETE: CPT

## 2021-05-04 PROCEDURE — 99214 OFFICE O/P EST MOD 30 MIN: CPT

## 2021-05-04 NOTE — HISTORY OF PRESENT ILLNESS
[FreeTextEntry1] : Patient infected with COVID-19 in January 2021.  He was treated with monoclonal antibody infusion and subsequently has done well.\par \par Patient hospitalized 8/4/20-8/7/20 with fever, shortness of breath and diagnosis of UTI was made.\par \par With treatment of the infection and shortness of breath improved.\par His creatinine during that hospitalization ranged 1.4-2.0.\par A CT of the chest showed a moderate size hiatal hernia.\par A stable right lower pole renal mass lesion.  Sees Dr Layton nicholas\par Ectasia of the aorta.\par \par He'd had concerns re a new chest pain 6/26/2019.\par The pain was mid sternal region of his chest. Imdur was added in addition to his Ranexa and he remains free of anginal discomfort. \par \par Does express concerns re: SOB on exertion which is chronic .This does not appear to have substantially change and remains at his baseline\par There is some mild chronic bilateral ankle edema which is unchanged.\par \par Remains physically active around the house and has been socially isolated except dtr\par \par There is no  PND or orthopnea. \par \par Chronic  right hip pain and this seems to primarily limit his ambulatory ability.\par

## 2021-05-04 NOTE — ASSESSMENT
[FreeTextEntry1] :  ECG: Normal sinus rhythm at 60 first-degree AV block with minor nonspecific T wave abnormalities\par \par Laboratory data \par 3/31/21      BUN/creatinine 37/1.9\par 8/17/2020 BUN/creatinine 37/1.86\par \par 9/24/2020 BUN/creatinine 29/1.92\par \par 9/16/19:\par Creatinine 1.5\par \par 8/6/20 creatinine 1.6\par \par Hemoglobin 14.1\par \par       6/17/19     3/31/21\par Chol 115          114\par Tri. 270\par HDL 29            31\par LDL 32            22\par \par Creat. 1.54 ( baseline)\par K 4.3\par HGB 15.2\par \par \par Echocardiogram 7/26/19\par EF 55-60%\par Mod. aortic dilation measuring at 3.8 cm\par Inferior hypokinesis\par \par Cardiac catheterization 9/26/18:\par Left main no significant disease\par LAD 30% in-stent stenosis\par Diagonal one small disease\par Diagonal 2 tubular 85% stenosis STENTED\par Circumflex 40% stenosis\par marginal mild disease\par Right coronary artery large and dominant with ectasia 30% distal stenosis.\par \par A CT of the chest that showed 4.1 cm ascending aortic aneurysm. Thickening of the esophagus.\par An ultrasound demonstrated a previously known right renal mass of greater than 5 cm.\par \par Impression:\par \par 1. PETERSON which appears unchanged from baseline\par \par 2. Rt hip pain limits ambulation.\par \par 3. Chest pain which has resolved on Ranexa and Imdur\par     No recurrence of angina.\par \par 3 History of renal insufficiency with a stable creatinine now 1.9\par    Increase in creatinine noted on laboratory data from August and September.\par \par 4 Right Renal cell tumor which the patient prefers not to have addressed medically.\par \par 5 chronic dyspnea of uncertain etiology. Cardiac anatomy and left ventricular function and did not seem to explain   it.\par 6. Lipids controlled\par \par 7. Blood pressure controlled\par \par 8. Mild ascending aorta aneurysm 4.1 cm \par     Echocardiogram revealing stable mild dilation of ascending aorta and hypokinesis of the inferior basal wall             which is old. \par 9.  Mild increase in ankle edema on hydrochlorothiazide\par \par 10.  Status post COVID-19 infection with good resolution of symptoms\par \par Plan:\par 1.   Continue to F/U with PCP as scheduled and have all blood work faxed to our office.\par \par 2.   Contact our office immediately with worsening shortness of breath or recurrent chest pain.\par \par 3.  Repeat echocardiogram with regard to looking at his a sending aortic aneurysm.\par      Carotid study to relook at his known atherosclerotic carotid disease.\par \par 4.   We will obtain repeat blood work in 2 months.\par \par \par  F/u 4 months\par  18-Mar-2021 10:45

## 2021-05-04 NOTE — PHYSICAL EXAM
[General Appearance - Well Developed] : well developed [Normal Appearance] : normal appearance [General Appearance - Well Nourished] : well nourished [Normal Conjunctiva] : the conjunctiva exhibited no abnormalities [Normal Jugular Venous V Waves Present] : normal jugular venous V waves present [Respiration, Rhythm And Depth] : normal respiratory rhythm and effort [Exaggerated Use Of Accessory Muscles For Inspiration] : no accessory muscle use [Heart Sounds] : normal S1 and S2 [Abdomen Soft] : soft [Abdomen Tenderness] : non-tender [Abdomen Mass (___ Cm)] : no abdominal mass palpated [Nail Clubbing] : no clubbing of the fingernails [Cyanosis, Localized] : no localized cyanosis [Petechial Hemorrhages (___cm)] : no petechial hemorrhages [Skin Color & Pigmentation] : normal skin color and pigmentation [Skin Turgor] : normal skin turgor [] : no rash [Oriented To Time, Place, And Person] : oriented to person, place, and time [Impaired Insight] : insight and judgment were intact [No Anxiety] : not feeling anxious [FreeTextEntry1] : Very hard of hearing

## 2021-06-30 ENCOUNTER — RX RENEWAL (OUTPATIENT)
Age: 86
End: 2021-06-30

## 2021-09-07 ENCOUNTER — APPOINTMENT (OUTPATIENT)
Dept: CARDIOLOGY | Facility: CLINIC | Age: 86
End: 2021-09-07
Payer: MEDICARE

## 2021-09-07 PROCEDURE — 93880 EXTRACRANIAL BILAT STUDY: CPT

## 2021-09-07 PROCEDURE — 93306 TTE W/DOPPLER COMPLETE: CPT

## 2021-09-15 ENCOUNTER — APPOINTMENT (OUTPATIENT)
Dept: CARDIOLOGY | Facility: CLINIC | Age: 86
End: 2021-09-15
Payer: MEDICARE

## 2021-09-15 VITALS
SYSTOLIC BLOOD PRESSURE: 132 MMHG | BODY MASS INDEX: 29.71 KG/M2 | HEIGHT: 64 IN | OXYGEN SATURATION: 96 % | HEART RATE: 61 BPM | WEIGHT: 174 LBS | RESPIRATION RATE: 16 BRPM | DIASTOLIC BLOOD PRESSURE: 78 MMHG

## 2021-09-15 PROCEDURE — 93000 ELECTROCARDIOGRAM COMPLETE: CPT

## 2021-09-15 PROCEDURE — 99214 OFFICE O/P EST MOD 30 MIN: CPT

## 2021-09-15 NOTE — ASSESSMENT
[FreeTextEntry1] :  ECG: Normal sinus rhythm at 60 first-degree AV block with minor nonspecific T wave abnormalities\par \par Laboratory data \par 3/31/21      BUN/creatinine 37/1.9\par 8/17/2020 BUN/creatinine 37/1.86\par \par 9/24/2020 BUN/creatinine 29/1.92\par \par 9/16/19:\par Creatinine 1.5\par \par 8/6/20 creatinine 1.6\par \par Hemoglobin 14.1\par \par       6/17/19     3/31/21\par Chol 115          114\par Tri. 270\par HDL 29            31\par LDL 32            22\par \par Creat. 1.54 ( baseline)\par K 4.3\par HGB 15.2\par \par Echocardiogram 9/7/2021:\par Mild to moderate LVH with overall normal systolic function.  Inferior and posterior wall are hypokinetic.\par Mild dilatation ascending aorta\par Relatively unchanged comparison prior study\par \par Echocardiogram 7/26/19\par EF 55-60%\par Mod. aortic dilation measuring at 3.8 cm\par Inferior hypokinesis\par \par Carotid Doppler 9/7/2021:\par Left carotid: Mild plaque of the ICA\par Right carotid minimal plaque of the right bulb\par \par Cardiac catheterization 9/26/18:\par Left main no significant disease\par LAD 30% in-stent stenosis\par Diagonal one small disease\par Diagonal 2 tubular 85% stenosis STENTED\par Circumflex 40% stenosis\par marginal mild disease\par Right coronary artery large and dominant with ectasia 30% distal stenosis.\par \par A CT of the chest that showed 4.1 cm ascending aortic aneurysm. Thickening of the esophagus.\par An ultrasound demonstrated a previously known right renal mass of greater than 5 cm.\par \par Impression:\par \par 1. PETERSON which appears unchanged from baseline\par \par 2. Rt hip pain limits ambulation.\par \par 3. Chest pain which has resolved on Ranexa and Imdur\par     No recurrence of angina.\par \par 3 History of renal insufficiency with a stable creatinine now 1.9\par    Increase in creatinine noted on laboratory data from August and September.\par \par 4 Right Renal cell tumor which the patient prefers not to have addressed medically.\par \par 5 chronic dyspnea of uncertain etiology. Cardiac anatomy and left ventricular function and did not seem to explain   it.\par 6. Lipids controlled\par \par 7. Blood pressure controlled\par \par 8. Mild ascending aorta aneurysm 3.9  cm \par     Echocardiogram revealing stable mild dilation of ascending aorta and hypokinesis of the inferior basal wall             which is old. \par \par 9.  Mild increase in ankle edema on hydrochlorothiazide\par \par 10.  Status post COVID-19 infection with good resolution of symptoms\par \par Plan:\par 1.   Continue to F/U with PCP as scheduled and have all blood work faxed to our office.\par \par 2.   Contact our office immediately with worsening shortness of breath or recurrent chest pain.\par \par 3.  No indication to change the current management strategy.\par \par 4.   We will obtain repeat blood work in 2 months.\par \par \par  F/u 4 months\par

## 2021-09-15 NOTE — HISTORY OF PRESENT ILLNESS
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  This required a plastics procedure with apparently a fairly lengthy recovery.  I was not contacted preoperatively and have no further details.\par \par Patient infected with COVID-19 in January 2021.  He was treated with monoclonal antibody infusion and subsequently has done well.\par \par Patient hospitalized 8/4/20-8/7/20 with fever, shortness of breath and diagnosis of UTI was made.\par \par With treatment of the infection and shortness of breath improved.\par His creatinine during that hospitalization ranged 1.4-2.0.\par A CT of the chest showed a moderate size hiatal hernia.\par \par A stable right lower pole renal mass lesion.  Sees Dr Layton nicholas\par \par Ectasia of the aorta.\par \par He'd had concerns re a new chest pain 6/26/2019.\par The pain was mid sternal region of his chest. Imdur was added in addition to his Ranexa and he remains free of anginal discomfort. \par \par Does express concerns re: SOB on exertion which is chronic .This does not appear to have substantially change and remains at his baseline\par There is some mild chronic bilateral ankle edema which is unchanged.\par \par Remains physically active around the house and has been socially isolated except dtr\par \par There is no  PND or orthopnea. \par \par Chronic  right hip pain and this seems to primarily limit his ambulatory ability.\par

## 2021-09-15 NOTE — REASON FOR VISIT
[FreeTextEntry1] : This is a 89   year old patient presenting for cardiac reevaluation.\par .\par Cardiac hx is that of :\par \par 1. History of coronary artery disease.\par \par 2. Remote CABG in 1972\par \par 3. Remote PCI is 2001 in 2002\par \par 4. Chronic kidney disease with a history of a renal cell carcinoma\par \par 5.Stenting of diagonal 2  9/26/18:\par \par

## 2022-02-27 ENCOUNTER — NON-APPOINTMENT (OUTPATIENT)
Age: 87
End: 2022-02-27

## 2022-02-28 ENCOUNTER — NON-APPOINTMENT (OUTPATIENT)
Age: 87
End: 2022-02-28

## 2022-03-02 ENCOUNTER — APPOINTMENT (OUTPATIENT)
Dept: CARDIOLOGY | Facility: CLINIC | Age: 87
End: 2022-03-02

## 2022-03-02 ENCOUNTER — NON-APPOINTMENT (OUTPATIENT)
Age: 87
End: 2022-03-02

## 2022-03-16 ENCOUNTER — APPOINTMENT (OUTPATIENT)
Dept: CARDIOLOGY | Facility: CLINIC | Age: 87
End: 2022-03-16
Payer: MEDICARE

## 2022-03-16 VITALS
DIASTOLIC BLOOD PRESSURE: 68 MMHG | HEART RATE: 57 BPM | HEIGHT: 64 IN | OXYGEN SATURATION: 96 % | SYSTOLIC BLOOD PRESSURE: 120 MMHG | WEIGHT: 166 LBS | RESPIRATION RATE: 16 BRPM | BODY MASS INDEX: 28.34 KG/M2

## 2022-03-16 DIAGNOSIS — K21.9 GASTRO-ESOPHAGEAL REFLUX DISEASE W/OUT ESOPHAGITIS: ICD-10-CM

## 2022-03-16 PROCEDURE — 99215 OFFICE O/P EST HI 40 MIN: CPT

## 2022-03-16 PROCEDURE — 93000 ELECTROCARDIOGRAM COMPLETE: CPT

## 2022-03-16 RX ORDER — FAMOTIDINE 40 MG/1
40 TABLET, FILM COATED ORAL DAILY
Refills: 0 | Status: DISCONTINUED | COMMUNITY
End: 2022-03-16

## 2022-03-16 RX ORDER — ASPIRIN 81 MG
81 TABLET, DELAYED RELEASE (ENTERIC COATED) ORAL DAILY
Refills: 0 | Status: DISCONTINUED | COMMUNITY
End: 2022-03-16

## 2022-03-16 NOTE — HISTORY OF PRESENT ILLNESS
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  This required a plastics procedure with apparently a fairly lengthy recovery.  I was not contacted preoperatively and have no further details.\par \par Patient infected with COVID-19 in January 2021.  He was treated with monoclonal antibody infusion and subsequently has done well.\par \par Patient hospitalized 8/4/20-8/7/20 with fever, shortness of breath and diagnosis of UTI was made.\par His creatinine during that hospitalization ranged 1.4-2.0.\par A CT of the chest showed a moderate size hiatal hernia.\par \par A stable right lower pole renal mass lesion.  Sees Dr Layton nicholas\par \par Ectasia of the aorta.\par \par He'd had concerns re a new chest pain 6/26/2019.\par Chest pain at that time responded well to Ranexa.\par \par Does express concerns re: SOB on exertion which is chronic .This does not appear to have substantially change and remains at his baseline\par There is some mild chronic bilateral ankle edema which is currently just trace\par \par Remains physically active around the house and has been socially isolated except dtr\par \par There is no  PND or orthopnea. \par \par Chronic  right hip pain and this seems to primarily limit his ambulatory ability.\par

## 2022-03-16 NOTE — REASON FOR VISIT
[FreeTextEntry1] : This is a 89   year old patient presenting for cardiac reevaluation s/p hospitalization at [facility] 3/1/22-3/4/22 for CP patient initially presented to the ER 2/28/2022 with some discomfort thought to be atypical and discharged home.\par At home he was very weak and continued to feel poorly with some vomiting and came back to the ER on 3/1/2022.\par \par His work-up included an EGD showing gastritis and esophagitis.\par \par He had positive troponins of 74 and 75\par \par Creatinine was elevated fluctuating between 1.67 and 1.93.\par .  Patient was advised to consider an outpatient nuclear stress test and is inquiring about that.\par \par Cardiac hx is that of :\par \par 1. History of coronary artery disease.\par \par 2. Remote CABG in 1972\par \par 3. Remote PCI is 2001 in 2002\par \par 4. Chronic kidney disease with a history of a renal cell carcinoma\par \par 5.Stenting of diagonal 2  9/26/18:\par \par

## 2022-03-16 NOTE — ASSESSMENT
[FreeTextEntry1] :  ECG: Normal sinus rhythm at 60 first-degree AV block with minor nonspecific T wave abnormalities\par \par Laboratory data \par 3/31/21      BUN/creatinine 37/1.9\par 8/17/2020 BUN/creatinine 37/1.86\par \par 9/24/2020 BUN/creatinine 29/1.92\par \par 9/16/19:\par Creatinine 1.5\par \par 8/6/20 creatinine 1.6\par \par Hemoglobin 14.1\par \par       6/17/19     3/31/21\par Chol 115          114\par Tri. 270\par HDL 29            31\par LDL 32            22\par \par Creat. 1.54 ( baseline)\par K 4.3\par HGB 15.2\par \par Echocardiogram 3/1/2022 ([facility])\par LVEF 55 to 60%\par 1+ mitral regurgitation\par \par Echocardiogram 9/7/2021:\par Mild to moderate LVH with overall normal systolic function.  Inferior and posterior wall are hypokinetic.\par Mild dilatation ascending aorta\par Relatively unchanged comparison prior study\par \par Echocardiogram 7/26/19\par EF 55-60%\par Mod. aortic dilation measuring at 3.8 cm\par Inferior hypokinesis\par \par Carotid Doppler 9/7/2021:\par Left carotid: Mild plaque of the ICA\par Right carotid minimal plaque of the right bulb\par \par Cardiac catheterization 9/26/18:\par Left main no significant disease\par LAD 30% in-stent stenosis\par Diagonal one small disease\par Diagonal 2 tubular 85% stenosis STENTED\par Circumflex 40% stenosis\par marginal mild disease\par Right coronary artery large and dominant with ectasia 30% distal stenosis.\par \par A CT of the chest that showed 4.1 cm ascending aortic aneurysm. Thickening of the esophagus.\par An ultrasound demonstrated a previously known right renal mass of greater than 5 cm.\par \par Impression:\par \par 1.  Complex presentation which seem to include chest pain possibly due to esophagitis and gastritis, though associated with an elevated troponin as well suggestive of a non-ST elevation MI\par \par 2. Rt hip pain limits ambulation.\par \par 3.  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 Cardiac anatomy and left ventricular function and did not seem to explain   it.\par 6. Lipids controlled\par \par 7. Blood pressure controlled\par \par 8. Mild ascending aorta aneurysm 3.9  cm \par     Echocardiogram revealing stable mild dilation of ascending aorta and hypokinesis of the inferior basal wall             which is old.  Though not report of the most recent echo\par \par 9.  Mild increase in ankle edema on hydrochlorothiazide\par \par 10.  Status post COVID-19 infection with good resolution of symptoms\par \par Plan:\par 1.   In view of the patient's age, low GFR, unclear presentation, and family's wish to avoid intervention, will treat him conservatively.\par We will cancel the nuclear stress test for now and only approach diagnostic studies if there is truly no recourse.  \par \par 2.Continue to F/U with PCP as scheduled and have all blood work faxed to our office.\par \par 3.   Contact our office immediately with worsening shortness of breath or recurrent chest pain.\par \par 3.  No indication to change the current management strategy.\par \par  F/u 4 months\par

## 2022-05-24 ENCOUNTER — APPOINTMENT (OUTPATIENT)
Dept: CARDIOLOGY | Facility: CLINIC | Age: 87
End: 2022-05-24
Payer: MEDICARE

## 2022-05-24 VITALS
OXYGEN SATURATION: 97 % | DIASTOLIC BLOOD PRESSURE: 65 MMHG | RESPIRATION RATE: 16 BRPM | WEIGHT: 165 LBS | SYSTOLIC BLOOD PRESSURE: 115 MMHG | BODY MASS INDEX: 28.17 KG/M2 | HEIGHT: 64 IN | HEART RATE: 64 BPM

## 2022-05-24 PROCEDURE — 93000 ELECTROCARDIOGRAM COMPLETE: CPT

## 2022-05-24 PROCEDURE — 99214 OFFICE O/P EST MOD 30 MIN: CPT

## 2022-05-24 RX ORDER — PANTOPRAZOLE 40 MG/1
40 TABLET, DELAYED RELEASE ORAL
Refills: 0 | Status: DISCONTINUED | COMMUNITY
End: 2022-05-24

## 2022-05-24 RX ORDER — FAMOTIDINE 20 MG/1
20 TABLET, FILM COATED ORAL
Qty: 90 | Refills: 3 | Status: ACTIVE | COMMUNITY

## 2022-05-24 RX ORDER — FINASTERIDE 5 MG/1
5 TABLET, FILM COATED ORAL
Qty: 90 | Refills: 0 | Status: ACTIVE | COMMUNITY
Start: 2022-05-21

## 2022-05-24 RX ORDER — GLIMEPIRIDE 4 MG/1
4 TABLET ORAL
Qty: 90 | Refills: 0 | Status: DISCONTINUED | COMMUNITY
Start: 2022-04-23

## 2022-05-24 NOTE — HISTORY OF PRESENT ILLNESS
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  This required a plastics procedure with apparently a fairly lengthy recovery.  I was not contacted preoperatively and have no further details.\par \par Patient infected with COVID-19 in January 2021.  He was treated with monoclonal antibody infusion and subsequently has done well.\par \par Patient hospitalized 8/4/20-8/7/20 with fever, shortness of breath and diagnosis of UTI was made.\par His creatinine during that hospitalization ranged 1.4-2.0.\par A CT of the chest showed a moderate size hiatal hernia.\par \par A stable right lower pole renal mass lesion. 4 cm,   Sees Dr Layton nicholas\par \par Ectasia of the aorta.\par \par He'd had concerns re a new chest pain 6/26/2019.\par Chest pain at that time responded well to Ranexa.\par \par Does express concerns re: SOB on exertion which is chronic .This does not appear to have substantially change and remains at his baseline\par There is some mild chronic bilateral ankle edema. Eats some high Na + foods\par \par Remains physically active around the house and has been socially isolated except dtr\par \par There is no  PND or orthopnea. \par \par Chronic  right hip pain and this seems to primarily limit his ambulatory ability.\par

## 2022-05-24 NOTE — REASON FOR VISIT
[FreeTextEntry1] : This is a 89   year old patient presenting for cardiac reevaluation.\par \par Hx of  hospitalization at [facility] 3/1/22-3/4/22 for CP patient initially presented to the ER 2/28/2022 with some discomfort thought to be atypical and discharged home.\par At home he was very weak and continued to feel poorly with some vomiting and came back to the ER on 3/1/2022.\par His work-up included an EGD showing gastritis and esophagitis.\par ASA was DC'd\par \par He had positive troponins of 74 and 75\par \par Creatinine was elevated fluctuating between 1.67 and 1.93.\par .  Patient was advised to consider an outpatient nuclear stress test. \par \par Cardiac hx is that of :\par \par 1. History of coronary artery disease.\par \par 2. Remote CABG in 1972\par \par 3. Remote PCI is 2001 in 2002\par \par 4. Chronic kidney disease with a history of a renal cell carcinoma\par \par 5.Stenting of diagonal 2  9/26/18:\par \par

## 2022-05-24 NOTE — REVIEW OF SYSTEMS
[Weight Loss (___ Lbs)] : no recent weight loss [FreeTextEntry2] : Other than as documented here and in the HPI, the thirteen point ROS is negative

## 2022-05-24 NOTE — ASSESSMENT
[FreeTextEntry1] :  ECG: Normal sinus rhythm at 64  with minor nonspecific T wave abnormalities\par \par Laboratory data \par 3/31/21      BUN/creatinine 37/1.9\par 8/17/2020 BUN/creatinine 37/1.86\par \par 9/24/2020 BUN/creatinine 29/1.92\par \par 9/16/19:\par Creatinine 1.5\par \par 8/6/20 creatinine 1.6\par \par Hemoglobin 14.1\par \par       6/17/19     3/31/21\par Chol 115          114\par Tri. 270\par HDL 29            31\par LDL 32            22\par \par Creat. 1.54 ( baseline)\par K 4.3\par HGB 15.2\par \par Echocardiogram 3/1/2022 ([facility])\par LVEF 55 to 60%\par 1+ mitral regurgitation\par \par Echocardiogram 9/7/2021:\par Mild to moderate LVH with overall normal systolic function.  Inferior and posterior wall are hypokinetic.\par Mild dilatation ascending aorta\par Relatively unchanged comparison prior study\par \par Echocardiogram 7/26/19\par EF 55-60%\par Mod. aortic dilation measuring at 3.8 cm\par Inferior hypokinesis\par \par Carotid Doppler 9/7/2021:\par Left carotid: Mild plaque of the ICA\par Right carotid minimal plaque of the right bulb\par \par Cardiac catheterization 9/26/18:\par Left main no significant disease\par LAD 30% in-stent stenosis\par Diagonal one small disease\par Diagonal 2 tubular 85% stenosis STENTED\par Circumflex 40% stenosis\par marginal mild disease\par Right coronary artery large and dominant with ectasia 30% distal stenosis.\par \par A CT of the chest that showed 4.1 cm ascending aortic aneurysm. Thickening of the esophagus.\par An ultrasound demonstrated a previously known right renal mass of greater than 5 cm.\par \par Impression:\par \par 1.  March '22, Complex presentation which seem to include chest pain possibly due to esophagitis and gastritis, though associated with an elevated troponin as well suggestive of a non-ST elevation MI\par \par 2. Rt hip pain limits ambulation.\par \par 3.  Anginal syndrome which previously had improved on Ranexa and Imdur\par     Recurrence of chest pain in association with elevated troponin suggestive of non-ST elevation MI.\par \par 3 History of renal insufficiency with a stable creatinine fluctuating between 1.6 and 1.9.\par    Increase in creatinine noted on laboratory data from August and September.\par \par 4 Right Renal cell tumor stable at 4 cm,  which the patient prefers not to have addressed medically.\par \par 5 chronic dyspnea of uncertain etiology. Cardiac anatomy and left ventricular function and did not seem to explain   it.\par 6. Lipids controlled\par \par 7. Blood pressure controlled\par \par 8. Mild ascending aorta aneurysm 3.9  cm \par     Echocardiogram revealing stable mild dilation of ascending aorta and hypokinesis of the inferior basal wall             which is old.  Though not report of the most recent echo\par \par 9.  Mild increase in ankle edema on hydrochlorothiazide.\par \par \par Plan:\par 1.   In view of the patient's age, low GFR, unclear presentation, and family's wish to avoid intervention, will treat him conservatively.\par We will cancel the nuclear stress test for now and only approach diagnostic studies if there is truly no recourse.  \par \par 2.Continue to F/U with PCP as scheduled and have all blood work faxed to our office.\par \par 3.   Contact our office immediately with worsening shortness, edema of breath or recurrent chest pain.\par \par 4.  In view of renal insufficiency, would not recommend diuretic therapy.\par \par 5.  In view of anemia history of gastritis and the continued use of Plavix, will leave the patient off of aspirin therapy\par \par  F/u 4 months\par

## 2022-06-08 ENCOUNTER — RX RENEWAL (OUTPATIENT)
Age: 87
End: 2022-06-08

## 2022-11-03 ENCOUNTER — APPOINTMENT (OUTPATIENT)
Dept: CARDIOLOGY | Facility: CLINIC | Age: 87
End: 2022-11-03

## 2022-11-03 VITALS
HEIGHT: 64 IN | WEIGHT: 166 LBS | RESPIRATION RATE: 16 BRPM | DIASTOLIC BLOOD PRESSURE: 70 MMHG | OXYGEN SATURATION: 97 % | SYSTOLIC BLOOD PRESSURE: 142 MMHG | HEART RATE: 67 BPM | BODY MASS INDEX: 28.34 KG/M2

## 2022-11-03 PROCEDURE — 93000 ELECTROCARDIOGRAM COMPLETE: CPT

## 2022-11-03 PROCEDURE — 99214 OFFICE O/P EST MOD 30 MIN: CPT

## 2022-11-03 RX ORDER — NITROGLYCERIN 0.4 MG/1
0.4 TABLET SUBLINGUAL
Qty: 30 | Refills: 3 | Status: ACTIVE | COMMUNITY
Start: 2019-06-27 | End: 1900-01-01

## 2022-11-03 NOTE — REASON FOR VISIT
[FreeTextEntry1] : This is a 89   year old patient presenting for cardiac reevaluation.\par \par  [facility] 3/1/22-3/4/22 for CP \par patient initially presented to the ER 2/28/2022 with some discomfort thought to be atypical and discharged home.\par At home he was very weak and continued to feel poorly with some vomiting and came back to the ER on 3/1/2022.\par His work-up included an EGD showing gastritis and esophagitis.\par ASA was DC'd\par \par He had positive troponins of 74 and 75\par \par Creatinine was elevated fluctuating between 1.67 and 1.93.\par .  Patient was advised to consider an outpatient nuclear stress test. \par \par Cardiac hx is that of :\par \par 1. History of coronary artery disease.\par \par 2. Remote CABG in 1972\par \par 3. Remote PCI is 2001 in 2002\par \par 4. Chronic kidney disease with a history of a renal cell carcinoma\par \par 5.Stenting of diagonal 2  9/26/18:\par \par

## 2022-11-03 NOTE — ASSESSMENT
[FreeTextEntry1] :  ECG: Normal sinus rhythm at 67  with minor nonspecific T wave abnormalities\par \par Laboratory data \par 3/31/21      BUN/creatinine 37/1.9\par 8/17/2020 BUN/creatinine 37/1.86\par \par 9/24/2020 BUN/creatinine 29/1.92\par \par 9/16/19:\par Creatinine 1.5\par \par 8/6/20 creatinine 1.6\par \par Hemoglobin 14.1\par -----6/17/19---3/31/21----8/23/22\par Chol---115------114---------108\par Tri-----270--------------------309\par HDL----29------- 31----------22\par LDL----32--------22----------53\par \par Creat.--1.54------------------1.82\par GFR----------------------------34\par K 4.3\par HGB 15.2\par \par Echocardiogram 3/1/2022 ([facility])\par LVEF 55 to 60%\par 1+ mitral regurgitation\par \par Echocardiogram 9/7/2021:\par Mild to moderate LVH with overall normal systolic function.  Inferior and posterior wall are hypokinetic.\par Mild dilatation ascending aorta\par Relatively unchanged comparison prior study\par \par Echocardiogram 7/26/19\par EF 55-60%\par Mod. aortic dilation measuring at 3.8 cm\par Inferior hypokinesis\par \par Carotid Doppler 9/7/2021:\par Left carotid: Mild plaque of the ICA\par Right carotid minimal plaque of the right bulb\par \par Cardiac catheterization 9/26/18:\par Left main no significant disease\par LAD 30% in-stent stenosis\par Diagonal one small disease\par Diagonal 2 tubular 85% stenosis STENTED\par Circumflex 40% stenosis\par marginal mild disease\par Right coronary artery large and dominant with ectasia 30% distal stenosis.\par \par A CT of the chest that showed 4.1 cm ascending aortic aneurysm. Thickening of the esophagus.\par An ultrasound demonstrated a previously known right renal mass of greater than 5 cm.\par \par Impression:\par \par 1.  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 Rt hip pain limits ambulation.\par \par 3. History of renal insufficiency with a stable creatinine fluctuating between 1.6 and 1.9.\par    Increase in creatinine noted on laboratory data from August and September.\par \par 4 Right Renal cell tumor stable at 4 cm,  which the patient prefers not to have addressed medically.\par \par 5 chronic dyspnea of uncertain etiology. Cardiac anatomy and left ventricular function and did not seem to explain   it.\par 6.  Hyperlipidemia: Lipids controlled\par \par 7. Blood pressure controlled\par \par 8. Mild ascending aorta aneurysm 3.9  cm \par     Echocardiogram revealing stable mild dilation of ascending aorta and hypokinesis of the inferior basal wall             which is old.  Though not report of the most recent echo\par \par 9.  Chronic ankle edema on hydrochlorothiazide.\par \par \par Plan:\par 1.   In view of the patient's age, low GFR, unclear presentation, and family's wish to avoid intervention, will treat him conservatively.  Renewal of his sublingual nitroglycerin was provided which she will use only if symptoms persist more than the usual 3 minutes.\par \par 2.Continue to F/U with PCP as scheduled and have all blood work faxed to our office.\par \par 3. Contact our office immediately with worsening shortness, edema of breath or recurrent chest pain.\par \par 4.  In view of renal insufficiency, would not recommend diuretic therapy.\par \par 5.  In view of anemia history of gastritis and the continued use of Plavix, will leave the patient off of aspirin therapy\par \par  F/u 4 months\par

## 2022-11-03 NOTE — HISTORY OF PRESENT ILLNESS
[FreeTextEntry1] : Now having chest pain that is described as being bilateral anterior lasting 2 to 3 minutes occurring about 2-3 times a week.  \par Does not believe it is terribly different from what he had in the past.  \par Is not treating it with sublingual nitroglycerin because of headaches.  \par There is no radiation pattern or any other associated symptoms.  \par He'd had concerns re a new chest pain 6/26/2019.\par Chest pain at that time responded well to Ranexa.\par \par Does express concerns re: SOB on exertion which is chronic .This does not appear to have substantially change and remains at his baseline\par There is some mild chronic bilateral ankle edema. 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  This required a plastics procedure with apparently a fairly lengthy recovery.  I was not contacted preoperatively and have no further details.\par \par Patient infected with COVID-19 in January 2021.  He was treated with monoclonal antibody infusion and subsequently has done well.\par \par Patient hospitalized 8/4/20-8/7/20 with fever, shortness of breath and diagnosis of UTI was made.\par His creatinine during that hospitalization ranged 1.4-2.0.\par A CT of the chest showed a moderate size hiatal hernia.\par \par A stable right lower pole renal mass lesion. 4 cm,   Sees Dr Layton nicholas\par \par Ectasia of the aorta.\par \par \par \par Remains physically active around the house and has been socially isolated except dtr\par \par There is no  PND or orthopnea. \par \par Chronic  right hip pain and this seems to primarily limit his ambulatory ability.\par

## 2023-03-09 ENCOUNTER — APPOINTMENT (OUTPATIENT)
Dept: CARDIOLOGY | Facility: CLINIC | Age: 88
End: 2023-03-09
Payer: MEDICARE

## 2023-03-09 VITALS
DIASTOLIC BLOOD PRESSURE: 62 MMHG | BODY MASS INDEX: 28.68 KG/M2 | HEART RATE: 61 BPM | RESPIRATION RATE: 16 BRPM | SYSTOLIC BLOOD PRESSURE: 112 MMHG | OXYGEN SATURATION: 95 % | WEIGHT: 168 LBS | HEIGHT: 64 IN

## 2023-03-09 DIAGNOSIS — R55 SYNCOPE AND COLLAPSE: ICD-10-CM

## 2023-03-09 PROCEDURE — 93000 ELECTROCARDIOGRAM COMPLETE: CPT

## 2023-03-09 PROCEDURE — 99214 OFFICE O/P EST MOD 30 MIN: CPT

## 2023-03-09 RX ORDER — TAMSULOSIN HYDROCHLORIDE 0.4 MG/1
0.4 CAPSULE ORAL
Refills: 0 | Status: DISCONTINUED | COMMUNITY
End: 2023-03-09

## 2023-03-09 NOTE — REASON FOR VISIT
[FreeTextEntry1] : This is a 89   year old patient presenting for cardiac reevaluation.\par \par  [facility] 3/1/22-3/4/22 for CP \par patient initially presented to the ER 2/28/2022 with some discomfort thought to be atypical and discharged home.\par At home he was very weak and continued to feel poorly with some vomiting and came back to the ER on 3/1/2022.\par His work-up included an EGD showing gastritis and esophagitis.\par ASA was DC'd\par \par He had positive troponins of 74 and 75\par \par Creatinine was elevated fluctuating between 1.67 and 1.93.\par  Patient was advised at that time to consider an outpatient nuclear stress test. \par He has had no recurrent chest pain since.\par \par Cardiac hx is that of :\par \par 1. History of coronary artery disease.\par \par 2. Remote CABG in 1972\par \par 3. Remote PCI is 2001 in 2002\par \par 4. Chronic kidney disease with a history of a renal cell carcinoma\par \par 5.Stenting of diagonal 2  9/26/18:\par \par

## 2023-03-09 NOTE — HISTORY OF PRESENT ILLNESS
[FreeTextEntry1] : No recurrence of his previously before chest pain-   bilateral anterior lasting 2 to 3 minutes occurring about 2-3 times a week.  It was similar to what he is experienced previously.  \par He avoids sublingual nitroglycerin because of headaches.  \par There is no radiation pattern or any other associated symptoms.  Chest pain at that time responded well to Ranexa.\par \par Does express concerns re: SOB on exertion which is chronic .This does not appear to have substantially change and remains at his baseline\par There is some mild chronic bilateral ankle edema. 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  This required a plastics procedure with apparently a fairly lengthy recovery.  I was not contacted preoperatively and have no further details.\par \par Patient infected with COVID-19 in January 2021.  He was treated with monoclonal antibody infusion and subsequently has done well.\par \par Patient hospitalized 8/4/20-8/7/20 with fever, shortness of breath and diagnosis of UTI was made.\par His creatinine during that hospitalization ranged 1.4-2.0.\par A CT of the chest showed a moderate size hiatal hernia.\par \par A stable right lower pole renal mass lesion. 4 cm,   Sees Dr Layton nicholas\par \par Ectasia of the aorta.\par \par Remains physically active around the house and has been socially isolated except dtr\par \par There is no  PND or orthopnea. \par \par Chronic  right hip pain and this seems to primarily limit his ambulatory ability.\par

## 2023-03-09 NOTE — ASSESSMENT
[FreeTextEntry1] :  ECG: Normal sinus rhythm at 61  with minor nonspecific T wave abnormalities\par \par Laboratory data \par 3/31/21      BUN/creatinine 37/1.9\par 8/17/2020 BUN/creatinine 37/1.86\par \par 9/24/2020 BUN/creatinine 29/1.92\par \par 9/16/19:\par Creatinine 1.5\par \par 8/6/20 creatinine 1.6\par \par Hemoglobin 14.1\par -----6/17/19---3/31/21----8/23/22\par Chol---115------114---------108\par Tri-----270--------------------309\par HDL----29------- 31----------22\par LDL----32--------22----------53\par \par Creat.--1.54------------------1.82\par GFR----------------------------34\par K 4.3\par HGB 15.2\par \par Echocardiogram 3/1/2022 ([facility])\par LVEF 55 to 60%\par 1+ mitral regurgitation\par \par Echocardiogram 9/7/2021:\par Mild to moderate LVH with overall normal systolic function.  Inferior and posterior wall are hypokinetic.\par Mild dilatation ascending aorta\par Relatively unchanged comparison prior study\par \par Echocardiogram 7/26/19\par EF 55-60%\par Mod. aortic dilation measuring at 3.8 cm\par Inferior hypokinesis\par \par Carotid Doppler 9/7/2021:\par Left carotid: Mild plaque of the ICA\par Right carotid minimal plaque of the right bulb\par \par Cardiac catheterization 9/26/18:\par Left main no significant disease\par LAD 30% in-stent stenosis\par Diagonal one small disease\par Diagonal 2 tubular 85% stenosis STENTED\par Circumflex 40% stenosis\par marginal mild disease\par Right coronary artery large and dominant with ectasia 30% distal stenosis.\par \par A CT of the chest that showed 4.1 cm ascending aortic aneurysm. Thickening of the esophagus.\par An ultrasound demonstrated a previously known right renal mass of greater than 5 cm.\par \par Impression:\par \par 1.  Chronic anginal symptoms, which, in general, have responded well to Ranexa (1000 twice daily) and Imdur (60       mg a day) now with some breakthrough over the last 6 months to a year.\par      \par 2. Rt hip pain limits ambulation.\par \par 3. History of renal insufficiency with a stable creatinine fluctuating between 1.6 and 1.9.\par    Increase in creatinine noted on laboratory data from August and September.\par \par 4 Right Renal cell tumor stable at 4 cm,  which the patient prefers not to have addressed medically.\par \par 5 chronic dyspnea of uncertain etiology. Cardiac anatomy and left ventricular function and did not seem to explain   it.\par 6.  Hyperlipidemia: Lipids controlled\par \par 7. Blood pressure controlled\par \par 8. Mild ascending aorta aneurysm 3.9  cm \par     Echocardiogram revealing stable mild dilation of ascending aorta and hypokinesis of the inferior basal wall             which is old.  Though not report of the most recent echo\par \par 9.  Chronic ankle edema on hydrochlorothiazide.\par \par \par Plan:\par 1.   In view of the patient's age, low GFR, unclear presentation, and family's wish to avoid intervention, will treat him conservatively.  Renewal of his sublingual nitroglycerin was provided which she will use only if symptoms persist more than the usual 3 minutes.\par \par 2.Continue to F/U with PCP as scheduled and have all blood work faxed to our office.\par \par 3. Contact our office immediately with worsening shortness, edema of breath or recurrent chest pain.\par \par 4.  In view of renal insufficiency, would not recommend diuretic therapy.\par \par 5.  In view of anemia history of gastritis and the continued use of Plavix, will leave the patient off of aspirin therapy\par \par  F/u 4 months\par

## 2023-07-05 ENCOUNTER — APPOINTMENT (OUTPATIENT)
Dept: CARDIOLOGY | Facility: CLINIC | Age: 88
End: 2023-07-05
Payer: MEDICARE

## 2023-07-05 VITALS
HEIGHT: 64 IN | RESPIRATION RATE: 14 BRPM | WEIGHT: 166 LBS | SYSTOLIC BLOOD PRESSURE: 132 MMHG | DIASTOLIC BLOOD PRESSURE: 70 MMHG | HEART RATE: 72 BPM | OXYGEN SATURATION: 96 % | BODY MASS INDEX: 28.34 KG/M2

## 2023-07-05 DIAGNOSIS — C64.9 MALIGNANT NEOPLASM OF UNSPECIFIED KIDNEY, EXCEPT RENAL PELVIS: ICD-10-CM

## 2023-07-05 DIAGNOSIS — I20.9 ANGINA PECTORIS, UNSPECIFIED: ICD-10-CM

## 2023-07-05 PROCEDURE — 99214 OFFICE O/P EST MOD 30 MIN: CPT

## 2023-07-05 PROCEDURE — 93000 ELECTROCARDIOGRAM COMPLETE: CPT

## 2023-07-05 NOTE — REASON FOR VISIT
[FreeTextEntry1] : This is a 89 year old patient presenting for cardiac reevaluation.\par \par  [facility] 3/1/22-3/4/22 for CP \par patient initially presented to the ER 2/28/2022 with some discomfort thought to be atypical and discharged home.\par At home he was very weak and continued to feel poorly with some vomiting and came back to the ER on 3/1/2022.\par His work-up included an EGD showing gastritis and esophagitis.\par ASA was DC'd\par \par He had positive troponins of 74 and 75\par \par CKD: Creatinine was fluctuating between 1.67 and 1.93, now > 2.0.\par \par Cardiac hx is that of :\par \par 1. History of coronary artery disease.\par \par 2. Remote CABG in 1972\par \par 3. Remote PCI is 2001 in 2002\par \par 4. Chronic kidney disease with a history of a renal cell carcinoma\par      He has decided not to follow-up with regard to that.\par \par 5.Stenting of diagonal 2  9/26/18:\par \par

## 2023-07-05 NOTE — ASSESSMENT
[FreeTextEntry1] :  ECG: Normal sinus rhythm at 72   with minor nonspecific T wave abnormalities\par \par Laboratory data \par 3/31/21      BUN/creatinine 37/1.9\par 8/17/2020 BUN/creatinine 37/1.86\par \par 9/24/2020 BUN/creatinine 29/1.92\par \par 9/16/19:\par Creatinine 1.5\par \par 8/6/20 creatinine 1.6\par \par Hemoglobin 14.1\par -----6/17/19---3/31/21----8/23/22--5/5/23\par Chol---115------114---------108\par Tri-----270--------------------309\par HDL----29------- 31----------22\par LDL----32--------22----------53\par \par Creat.--1.54------------------1.82-----2.1\par GFR----------------------------34------28\par K 4.3\par HGB 15.2\par \par Echocardiogram 3/1/2022 ([facility])\par LVEF 55 to 60%\par 1+ mitral regurgitation\par \par Echocardiogram 9/7/2021:\par Mild to moderate LVH with overall normal systolic function.  Inferior and posterior wall are hypokinetic.\par Mild dilatation ascending aorta\par Relatively unchanged comparison prior study\par \par Echocardiogram 7/26/19\par EF 55-60%\par Mod. aortic dilation measuring at 3.8 cm\par Inferior hypokinesis\par \par Carotid Doppler 9/7/2021:\par Left carotid: Mild plaque of the ICA\par Right carotid minimal plaque of the right bulb\par \par Cardiac catheterization 9/26/18:\par Left main no significant disease\par LAD 30% in-stent stenosis\par Diagonal one small disease\par Diagonal 2 tubular 85% stenosis STENTED\par Circumflex 40% stenosis\par marginal mild disease\par Right coronary artery large and dominant with ectasia 30% distal stenosis.\par \par A CT of the chest that showed 4.1 cm ascending aortic aneurysm. Thickening of the esophagus.\par An ultrasound demonstrated a previously known right renal mass of greater than 5 cm.\par \par Impression:\par \par 1.  Chronic anginal symptoms, which, in general, has responded well to Ranexa (1000 twice daily) and Imdur (60       mg a day) now with rare breakthrough over the last 6 months to a year.\par      \par 2. Rt hip pain limits ambulation.\par \par 3. CKD: creatinine previously fluctuating between 1.6 and 1.9. Now - 2.1\par \par 4 Right Renal cell tumor  4 cm, when last checked.  No longer following up with urology as the patient prefers not to have this addressed medically.\par \par 5 chronic dyspnea of uncertain etiology. Cardiac anatomy and left ventricular function and did not seem to explain   it.\par 6.  Hyperlipidemia: Lipids controlled\par \par 7. Blood pressure controlled\par \par 8. Mild ascending aorta aneurysm 3.9  cm \par     Echocardiogram revealing stable mild dilation of ascending aorta and hypokinesis of the inferior basal wall             which is old.  Though not report of the most recent echo\par \par 9.  Increase of the bilateral edema on hydrochlorothiazide, likely contributed to by dietary indiscretion\par \par \par Plan:\par 1.   In view of the patient's age, low GFR, unclear presentation, and family's wish to avoid intervention, will treat him conservatively.  Renewal of his sublingual nitroglycerin was provided which she will use only if symptoms persist more than the usual 3 minutes.\par \par 2.instructed about need for sodium restriction and will reassess edema in 6 weeks.\par \par 3. Contact our office immediately with worsening shortness, edema of breath or recurrent chest pain.\par \par 4.  In view of renal insufficiency, will  not augment the diuretic therapy.\par \par 5.  In view of anemia history of gastritis and the continued use of Plavix, will leave the patient off of aspirin therapy\par \par  F/u 4 months\par

## 2023-07-05 NOTE — HISTORY OF PRESENT ILLNESS
[FreeTextEntry1] : No recurrence of chest pain- typically  bilateral anterior lasting 2 to 3 minutes occurring about 2-3 times a week.\par He avoids sublingual nitroglycerin because of headaches.  \par There is no radiation pattern or any other associated symptoms.  Chest pain at that time has responded well to Ranexa.\par \par Does express concerns re: SOB on exertion which is chronic .This does not appear to have substantially change and remains at his baseline\par There is some mild chronic bilateral ankle edema. Eats some high Na + foods\par \par Right eye surgery for resection of a carcinoma , that required a plastics procedure with apparently a fairly lengthy recovery.  I was not contacted preoperatively and have no further details.\par \par Had COVID-19 in January 2021.  He was treated with monoclonal antibody infusion and subsequently has done well.\par \par Patient hospitalized 8/4/20-8/7/20 with fever, shortness of breath and diagnosis of UTI was made.\par His creatinine during that hospitalization ranged 1.4-2.0.\par A CT of the chest showed a moderate size hiatal hernia.\par \par A stable right lower pole renal mass lesion. 4 cm,   Sees Dr Layton Frank\par \par Ectasia of the aorta.\par \par Remains physically active around the house and has been socially isolated except dtr\par \par There is no  PND or orthopnea. \par \par Chronic  right hip pain and this seems to primarily limit his ambulatory ability.\par

## 2023-08-24 ENCOUNTER — APPOINTMENT (OUTPATIENT)
Dept: CARDIOLOGY | Facility: CLINIC | Age: 88
End: 2023-08-24

## 2023-11-01 RX ORDER — RANOLAZINE 1000 MG/1
1000 TABLET, EXTENDED RELEASE ORAL
Qty: 180 | Refills: 1 | Status: ACTIVE | COMMUNITY
Start: 2019-09-11 | End: 1900-01-01

## 2023-11-29 ENCOUNTER — APPOINTMENT (OUTPATIENT)
Dept: CARDIOLOGY | Facility: CLINIC | Age: 88
End: 2023-11-29
Payer: MEDICARE

## 2023-11-29 VITALS
OXYGEN SATURATION: 98 % | DIASTOLIC BLOOD PRESSURE: 70 MMHG | BODY MASS INDEX: 28.17 KG/M2 | WEIGHT: 165 LBS | HEART RATE: 74 BPM | SYSTOLIC BLOOD PRESSURE: 126 MMHG | RESPIRATION RATE: 14 BRPM | HEIGHT: 64 IN

## 2023-11-29 DIAGNOSIS — R07.9 CHEST PAIN, UNSPECIFIED: ICD-10-CM

## 2023-11-29 PROCEDURE — 93000 ELECTROCARDIOGRAM COMPLETE: CPT

## 2023-11-29 PROCEDURE — 99214 OFFICE O/P EST MOD 30 MIN: CPT

## 2024-01-17 NOTE — ASU PATIENT PROFILE, ADULT - NS PRO ABUSE SCREEN SUSPICION NEGLECT YN
"Sister tested + for COVID Tuesday. Pt sts does not live with sister but dose see her on weekends. Pt wants tested since she is 38 wks pregnant. Pt does report h/a yesterday and \"a little congestion today\" as well as \"ongoing cough for weeks\"   "
no

## 2024-05-29 ENCOUNTER — APPOINTMENT (OUTPATIENT)
Dept: CARDIOLOGY | Facility: CLINIC | Age: 89
End: 2024-05-29
Payer: MEDICARE

## 2024-05-29 VITALS
WEIGHT: 166 LBS | OXYGEN SATURATION: 96 % | RESPIRATION RATE: 14 BRPM | SYSTOLIC BLOOD PRESSURE: 144 MMHG | HEART RATE: 62 BPM | DIASTOLIC BLOOD PRESSURE: 80 MMHG | BODY MASS INDEX: 28.34 KG/M2 | HEIGHT: 64 IN

## 2024-05-29 DIAGNOSIS — I10 ESSENTIAL (PRIMARY) HYPERTENSION: ICD-10-CM

## 2024-05-29 DIAGNOSIS — R06.09 OTHER FORMS OF DYSPNEA: ICD-10-CM

## 2024-05-29 DIAGNOSIS — I25.10 ATHEROSCLEROTIC HEART DISEASE OF NATIVE CORONARY ARTERY W/OUT ANGINA PECTORIS: ICD-10-CM

## 2024-05-29 DIAGNOSIS — E78.5 HYPERLIPIDEMIA, UNSPECIFIED: ICD-10-CM

## 2024-05-29 DIAGNOSIS — E11.9 TYPE 2 DIABETES MELLITUS W/OUT COMPLICATIONS: ICD-10-CM

## 2024-05-29 DIAGNOSIS — N18.9 CHRONIC KIDNEY DISEASE, UNSPECIFIED: ICD-10-CM

## 2024-05-29 DIAGNOSIS — I21.9 ACUTE MYOCARDIAL INFARCTION, UNSPECIFIED: ICD-10-CM

## 2024-05-29 DIAGNOSIS — Z95.5 PRESENCE OF CORONARY ANGIOPLASTY IMPLANT AND GRAFT: ICD-10-CM

## 2024-05-29 PROCEDURE — 99214 OFFICE O/P EST MOD 30 MIN: CPT

## 2024-05-29 PROCEDURE — 93000 ELECTROCARDIOGRAM COMPLETE: CPT

## 2024-05-29 PROCEDURE — G2211 COMPLEX E/M VISIT ADD ON: CPT

## 2024-05-29 NOTE — ASSESSMENT
[FreeTextEntry1] : ECG: Normal sinus rhythm at 62 LAD with minor nonspecific T wave abnormalities  Laboratory data 3/31/21 BUN/creatinine 37/1.9 8/17/2020 BUN/creatinine 37/1.86 9/24/2020 BUN/creatinine 29/1.92  9/16/19: Creatinine 1.5 8/6/20 creatinine 1.6 Hemoglobin 14.1  -----6/17/19---3/31/21----8/23/22--5/5/23  Chol---115------114---------108 Tri-----270--------------------309 HDL----29------- 31----------22 LDL----32--------22----------53 Creat.--1.54------------------1.82-----2.1 GFR----------------------------34------28  Echocardiogram 3/1/2022 ([facility]) LVEF 55 to 60% 1+ mitral regurgitation  Echocardiogram 9/7/2021: Mild to moderate LVH with overall normal systolic function. Inferior and posterior wall are hypokinetic. Mild dilatation ascending aorta Relatively unchanged comparison prior study  Echocardiogram 7/26/19 EF 55-60% Mod. aortic dilation measuring at 3.8 cm Inferior hypokinesis  Carotid Doppler 9/7/2021: Left carotid: Mild plaque of the ICA Right carotid minimal plaque of the right bulb  Cardiac catheterization 9/26/18: Left main no significant disease LAD 30% in-stent stenosis Diagonal one small disease Diagonal 2 tubular 85% stenosis STENTED Circumflex 40% stenosis marginal mild disease Right coronary artery large and dominant with ectasia 30% distal stenosis.  A CT of the chest that showed 4.1 cm ascending aortic aneurysm. Thickening of the esophagus.  An ultrasound demonstrated a previously known right renal mass of greater than 5 cm.  Impression:  1. Chronic anginal symptoms, which, in general, has responded well to Ranexa (1000 twice daily) and Imdur (60 mg a day) now with rare breakthrough over the last 6 months to a year.  2. Rt hip pain limits ambulation.  3. CKD: creatinine previously fluctuating between 1.6 and 1.9. Increased - 2.1. Nothing current.  4 Right Renal cell tumor 4 cm, when last checked. No longer following up with urology as the patient prefers not to have this addressed medically.  5 chronic dyspnea of uncertain etiology. Cardiac anatomy and left ventricular function and did not seem to explain it. 6. Hyperlipidemia: Lipids controlled  7. Blood pressure controlled  8. Mild ascending aorta aneurysm 3.9 cm   Echocardiogram revealing stable mild dilation of ascending aorta and hypokinesis of the inferior basal wall which is old. Though not report of the most recent echo  9. Increase of the bilateral edema on hydrochlorothiazide, likely contributed to by dietary indiscretion  Plan:  1. In view of the patient's age, low GFR, unclear presentation, and family's wish to avoid intervention, will treat him conservatively. Renewal of his sublingual nitroglycerin was provided which she will use only if symptoms persist more than the usual 3 minutes.  2.instructed about need for sodium restriction and will have him take an extra hydrochlorothiazide every 3 days.  3. Contact our office immediately with worsening shortness, edema of breath or recurrent chest pain.  4. In view of anemia history of gastritis and the continued use of Plavix, will leave the patient off of aspirin therapy.   F/u  6 months

## 2024-05-29 NOTE — REASON FOR VISIT
[FreeTextEntry1] : This is a 89 year old patient presenting for cardiac reevaluation.  GSH 3/1/22-3/4/22 for CP   ER 2/28/2022 with some discomfort thought to be atypical and discharged home. Continued feeling weak weak with some vomiting >>back to the ER on 3/1/2022. EGD -- gastritis and esophagitis. ASA was DC'd  He had positive troponins of 74 and 75  CKD: Creatinine was fluctuating between 1.67 and 1.93, now > 2.0.  Cardiac hx is that of :  1. History of coronary artery disease.  2. Remote CABG in 1972  3. Remote PCI is 2001 in 2002  4. Chronic kidney disease with a history of a renal cell carcinoma      He has decided not to follow-up with regard to that.  5.Stenting of diagonal 2  9/26/18:

## 2024-08-13 ENCOUNTER — RX RENEWAL (OUTPATIENT)
Age: 89
End: 2024-08-13

## 2024-11-21 ENCOUNTER — APPOINTMENT (OUTPATIENT)
Dept: CARDIOLOGY | Facility: CLINIC | Age: 89
End: 2024-11-21
Payer: MEDICARE

## 2024-11-21 VITALS
SYSTOLIC BLOOD PRESSURE: 118 MMHG | WEIGHT: 160.6 LBS | OXYGEN SATURATION: 94 % | RESPIRATION RATE: 15 BRPM | HEART RATE: 85 BPM | DIASTOLIC BLOOD PRESSURE: 70 MMHG | BODY MASS INDEX: 27.57 KG/M2

## 2024-11-21 DIAGNOSIS — Z95.5 PRESENCE OF CORONARY ANGIOPLASTY IMPLANT AND GRAFT: ICD-10-CM

## 2024-11-21 DIAGNOSIS — I10 ESSENTIAL (PRIMARY) HYPERTENSION: ICD-10-CM

## 2024-11-21 DIAGNOSIS — I20.9 ANGINA PECTORIS, UNSPECIFIED: ICD-10-CM

## 2024-11-21 DIAGNOSIS — C44.91 BASAL CELL CARCINOMA OF SKIN, UNSPECIFIED: ICD-10-CM

## 2024-11-21 DIAGNOSIS — R07.9 CHEST PAIN, UNSPECIFIED: ICD-10-CM

## 2024-11-21 DIAGNOSIS — I25.10 ATHEROSCLEROTIC HEART DISEASE OF NATIVE CORONARY ARTERY W/OUT ANGINA PECTORIS: ICD-10-CM

## 2024-11-21 DIAGNOSIS — E78.5 HYPERLIPIDEMIA, UNSPECIFIED: ICD-10-CM

## 2024-11-21 PROCEDURE — G2211 COMPLEX E/M VISIT ADD ON: CPT

## 2024-11-21 PROCEDURE — 99214 OFFICE O/P EST MOD 30 MIN: CPT

## 2024-11-21 PROCEDURE — 93000 ELECTROCARDIOGRAM COMPLETE: CPT

## 2025-05-19 ENCOUNTER — NON-APPOINTMENT (OUTPATIENT)
Age: 89
End: 2025-05-19

## 2025-05-21 ENCOUNTER — APPOINTMENT (OUTPATIENT)
Dept: CARDIOLOGY | Facility: CLINIC | Age: 89
End: 2025-05-21

## 2025-05-21 VITALS
SYSTOLIC BLOOD PRESSURE: 142 MMHG | RESPIRATION RATE: 16 BRPM | OXYGEN SATURATION: 97 % | BODY MASS INDEX: 25.61 KG/M2 | HEART RATE: 72 BPM | HEIGHT: 64 IN | DIASTOLIC BLOOD PRESSURE: 80 MMHG | WEIGHT: 150 LBS

## 2025-05-21 DIAGNOSIS — E11.9 TYPE 2 DIABETES MELLITUS W/OUT COMPLICATIONS: ICD-10-CM

## 2025-05-21 DIAGNOSIS — E78.5 HYPERLIPIDEMIA, UNSPECIFIED: ICD-10-CM

## 2025-05-21 DIAGNOSIS — R07.9 CHEST PAIN, UNSPECIFIED: ICD-10-CM

## 2025-05-21 DIAGNOSIS — I25.10 ATHEROSCLEROTIC HEART DISEASE OF NATIVE CORONARY ARTERY W/OUT ANGINA PECTORIS: ICD-10-CM

## 2025-05-21 DIAGNOSIS — I20.9 ANGINA PECTORIS, UNSPECIFIED: ICD-10-CM

## 2025-05-21 DIAGNOSIS — C64.9 MALIGNANT NEOPLASM OF UNSPECIFIED KIDNEY, EXCEPT RENAL PELVIS: ICD-10-CM

## 2025-05-21 DIAGNOSIS — I10 ESSENTIAL (PRIMARY) HYPERTENSION: ICD-10-CM

## 2025-05-21 DIAGNOSIS — R06.09 OTHER FORMS OF DYSPNEA: ICD-10-CM

## 2025-05-21 DIAGNOSIS — Z95.5 PRESENCE OF CORONARY ANGIOPLASTY IMPLANT AND GRAFT: ICD-10-CM

## 2025-05-21 PROCEDURE — G2211 COMPLEX E/M VISIT ADD ON: CPT

## 2025-05-21 PROCEDURE — 99214 OFFICE O/P EST MOD 30 MIN: CPT

## 2025-05-21 PROCEDURE — 93000 ELECTROCARDIOGRAM COMPLETE: CPT

## 2025-09-16 ENCOUNTER — APPOINTMENT (OUTPATIENT)
Dept: CARDIOLOGY | Facility: CLINIC | Age: 89
End: 2025-09-16

## 2025-09-16 VITALS
RESPIRATION RATE: 16 BRPM | BODY MASS INDEX: 24.75 KG/M2 | HEART RATE: 50 BPM | OXYGEN SATURATION: 95 % | HEIGHT: 64 IN | SYSTOLIC BLOOD PRESSURE: 122 MMHG | DIASTOLIC BLOOD PRESSURE: 60 MMHG | WEIGHT: 145 LBS

## 2025-09-16 DIAGNOSIS — C64.9 MALIGNANT NEOPLASM OF UNSPECIFIED KIDNEY, EXCEPT RENAL PELVIS: ICD-10-CM

## 2025-09-16 DIAGNOSIS — I20.9 ANGINA PECTORIS, UNSPECIFIED: ICD-10-CM

## 2025-09-16 DIAGNOSIS — E78.5 HYPERLIPIDEMIA, UNSPECIFIED: ICD-10-CM

## 2025-09-16 DIAGNOSIS — I10 ESSENTIAL (PRIMARY) HYPERTENSION: ICD-10-CM

## 2025-09-16 DIAGNOSIS — R07.9 CHEST PAIN, UNSPECIFIED: ICD-10-CM

## 2025-09-16 DIAGNOSIS — Z95.5 PRESENCE OF CORONARY ANGIOPLASTY IMPLANT AND GRAFT: ICD-10-CM

## 2025-09-16 DIAGNOSIS — I25.10 ATHEROSCLEROTIC HEART DISEASE OF NATIVE CORONARY ARTERY W/OUT ANGINA PECTORIS: ICD-10-CM

## 2025-09-16 DIAGNOSIS — E11.9 TYPE 2 DIABETES MELLITUS W/OUT COMPLICATIONS: ICD-10-CM
